# Patient Record
Sex: MALE | HISPANIC OR LATINO
[De-identification: names, ages, dates, MRNs, and addresses within clinical notes are randomized per-mention and may not be internally consistent; named-entity substitution may affect disease eponyms.]

---

## 2017-01-01 ENCOUNTER — HOSPITAL (OUTPATIENT)
Dept: OTHER | Age: 72
End: 2017-01-01
Attending: INTERNAL MEDICINE

## 2017-01-10 ENCOUNTER — PRIOR ORIGINAL RECORDS (OUTPATIENT)
Dept: OTHER | Age: 72
End: 2017-01-10

## 2017-01-12 ENCOUNTER — OFFICE VISIT (OUTPATIENT)
Dept: FAMILY MEDICINE CLINIC | Facility: CLINIC | Age: 72
End: 2017-01-12

## 2017-01-12 VITALS
WEIGHT: 168 LBS | DIASTOLIC BLOOD PRESSURE: 65 MMHG | SYSTOLIC BLOOD PRESSURE: 115 MMHG | BODY MASS INDEX: 26 KG/M2 | HEART RATE: 72 BPM

## 2017-01-12 DIAGNOSIS — E78.00 HIGH CHOLESTEROL: ICD-10-CM

## 2017-01-12 DIAGNOSIS — R73.03 PRE-DIABETES: ICD-10-CM

## 2017-01-12 DIAGNOSIS — I48.0 PAROXYSMAL A-FIB (HCC): ICD-10-CM

## 2017-01-12 DIAGNOSIS — N19 RENAL FAILURE: ICD-10-CM

## 2017-01-12 DIAGNOSIS — I34.1 MITRAL VALVE ANTERIOR LEAFLET PROLAPSE: Primary | ICD-10-CM

## 2017-01-12 DIAGNOSIS — I10 ESSENTIAL HYPERTENSION WITH GOAL BLOOD PRESSURE LESS THAN 130/80: ICD-10-CM

## 2017-01-12 PROCEDURE — G0463 HOSPITAL OUTPT CLINIC VISIT: HCPCS | Performed by: FAMILY MEDICINE

## 2017-01-12 PROCEDURE — 99214 OFFICE O/P EST MOD 30 MIN: CPT | Performed by: FAMILY MEDICINE

## 2017-01-12 NOTE — PROGRESS NOTES
\"I feel great. \"    I am so happy with my mitraclip. I don't hear the palpitations. \"    inr 2.1 last check    Sugar was only concern had 120 in the past.    \"I don't get dizzy I don't have a lot of thirst and I don't pee a lot.   I pee only once in the Renal failure  Stable. 4. High cholesterol  Excellent control    5. Essential hypertension with goal blood pressure less than 130/80  Stable. 6. Pre-diabetes  Recheck.

## 2017-01-21 LAB — INR BLDC: 1.5

## 2017-01-28 LAB — INR BLDC: 3.4

## 2017-02-01 ENCOUNTER — HOSPITAL (OUTPATIENT)
Dept: OTHER | Age: 72
End: 2017-02-01
Attending: INTERNAL MEDICINE

## 2017-02-04 LAB — INR BLDC: 4.1

## 2017-02-11 LAB — INR BLDC: 1.6

## 2017-02-18 LAB — INR BLDC: 3.9

## 2017-02-25 LAB — INR BLDC: 1.6

## 2017-03-06 ENCOUNTER — HOSPITAL (OUTPATIENT)
Dept: OTHER | Age: 72
End: 2017-03-06
Attending: INTERNAL MEDICINE

## 2017-03-06 LAB — INR BLDC: 1.6

## 2017-03-13 LAB — INR BLDC: 2.9

## 2017-04-01 ENCOUNTER — HOSPITAL (OUTPATIENT)
Dept: OTHER | Age: 72
End: 2017-04-01
Attending: INTERNAL MEDICINE

## 2017-04-15 LAB — INR BLDC: 2

## 2017-05-01 ENCOUNTER — HOSPITAL (OUTPATIENT)
Dept: OTHER | Age: 72
End: 2017-05-01
Attending: INTERNAL MEDICINE

## 2017-05-03 ENCOUNTER — HOSPITAL ENCOUNTER (OUTPATIENT)
Dept: CV DIAGNOSTICS | Facility: HOSPITAL | Age: 72
Discharge: HOME | End: 2017-05-03
Attending: THORACIC SURGERY (CARDIOTHORACIC VASCULAR SURGERY)
Payer: MEDICARE

## 2017-05-03 ENCOUNTER — HOSPITAL ENCOUNTER (OUTPATIENT)
Dept: CARDIOLOGY CLINIC | Facility: HOSPITAL | Age: 72
Discharge: HOME | End: 2017-05-03
Attending: NURSE PRACTITIONER
Payer: MEDICARE

## 2017-05-03 VITALS
TEMPERATURE: 99 F | OXYGEN SATURATION: 96 % | DIASTOLIC BLOOD PRESSURE: 81 MMHG | SYSTOLIC BLOOD PRESSURE: 139 MMHG | RESPIRATION RATE: 20 BRPM | HEART RATE: 82 BPM

## 2017-05-03 DIAGNOSIS — I35.0 AORTIC STENOSIS: ICD-10-CM

## 2017-05-03 PROCEDURE — 94620 HC PULMONARY STRESS TEST SIMPLE (6 MIN WALK): CPT

## 2017-05-03 PROCEDURE — 99214 OFFICE O/P EST MOD 30 MIN: CPT

## 2017-05-03 PROCEDURE — 93306 TTE W/DOPPLER COMPLETE: CPT

## 2017-05-03 PROCEDURE — 93306 TTE W/DOPPLER COMPLETE: CPT | Performed by: THORACIC SURGERY (CARDIOTHORACIC VASCULAR SURGERY)

## 2017-05-03 NOTE — PROGRESS NOTES
Frandy Roman Note    Subjective:   Patient presents for routine 1 year postop TMVR visit, accompanied by his wife. Patient underwent a transcather mitral valve repair with MitraClip x2 on 4/12/16 with improvement in MR from >4+ to 2+.  He un moderate regurgitation. 10. Pulmonary arteries: Systolic pressure was in the range of 60mm Hg to      65mm Hg.   Impressions:  This study is compared with previous dated 07/13/2016:  Increase in PA systolic pressure from approximately 45 mmHg to 60-65 mmHg today, the results of which will be sent to his cardiologist's office. He no longer needs to follow up at [facility] as he has completed the required 1 year post Mitraclip follow up. I advised him to follow up with  Dr Lacy Quiroz, and Dr Lennox Grow as directed.

## 2017-05-04 ENCOUNTER — TELEPHONE (OUTPATIENT)
Dept: CARDIOLOGY CLINIC | Facility: HOSPITAL | Age: 72
End: 2017-05-04

## 2017-05-20 LAB — INR BLDC: 2.5

## 2017-06-05 ENCOUNTER — PRIOR ORIGINAL RECORDS (OUTPATIENT)
Dept: OTHER | Age: 72
End: 2017-06-05

## 2017-06-08 ENCOUNTER — OFFICE VISIT (OUTPATIENT)
Dept: NEPHROLOGY | Facility: CLINIC | Age: 72
End: 2017-06-08

## 2017-06-08 VITALS
WEIGHT: 173.81 LBS | BODY MASS INDEX: 29.31 KG/M2 | SYSTOLIC BLOOD PRESSURE: 124 MMHG | DIASTOLIC BLOOD PRESSURE: 75 MMHG | HEIGHT: 64.5 IN | TEMPERATURE: 98 F | HEART RATE: 60 BPM

## 2017-06-08 DIAGNOSIS — R60.0 LOWER LEG EDEMA: ICD-10-CM

## 2017-06-08 DIAGNOSIS — N18.30 CKD (CHRONIC KIDNEY DISEASE), STAGE III (HCC): Primary | ICD-10-CM

## 2017-06-08 PROCEDURE — 99214 OFFICE O/P EST MOD 30 MIN: CPT | Performed by: INTERNAL MEDICINE

## 2017-06-08 PROCEDURE — G0463 HOSPITAL OUTPT CLINIC VISIT: HCPCS | Performed by: INTERNAL MEDICINE

## 2017-06-08 RX ORDER — TORSEMIDE 20 MG/1
20 TABLET ORAL DAILY
Qty: 90 TABLET | Refills: 1 | Status: SHIPPED | OUTPATIENT
Start: 2017-06-08 | End: 2017-06-21

## 2017-06-08 NOTE — PATIENT INSTRUCTIONS
Take torsemide 20mg twice a day for three day and than once a day   Daily weight and call on Monday with results  Lab work on Monday - Renal function test  Call to make appointment with Dr. Pao Pires for next week   Follow up in 2-3 weeks

## 2017-06-08 NOTE — PROGRESS NOTES
Progress Note     Brian Xie is a 66 yrs old male with pmh of CAD s/p PTCA and CABG x 4, A-fib on coumadin, CHF with EF 15-20%%, Mitral regurgitation s/p valve repair with clip, s/p ICD,  Ex tobacoo abuse x 20 yrs who presented today for follow up Scanned to Media Tab - Date of Service 01-    CABG      CARDIAC PACEMAKER PLACEMENT      Comment [facility]    CARDIAC DEFIBRILLATOR PLACEMENT      Comment [facility]. MITRACLIP  04/12/2016    Comment mitral valve           Medications (Active prior normal extraocular motion is intact  Nose/Mouth/Throat:mucous membranes are moist   Neck/Thyroid: neck is supple without adenopathy  Lymphatic: no abnormal cervical, supraclavicular adenopathy is noted  Respiratory:  lungs are clear to auscultation bilater

## 2017-06-09 ENCOUNTER — TELEPHONE (OUTPATIENT)
Dept: NEPHROLOGY | Facility: CLINIC | Age: 72
End: 2017-06-09

## 2017-06-09 NOTE — TELEPHONE ENCOUNTER
Current Outpatient Prescriptions:  Isosorb Dinitrate-Hydralazine 20-37.5 MG Oral Tab Take 2 tablets by mouth 3 (three) times daily.  Disp: 180 tablet Rfl: 0     PA request pls call [phone] Pt ID# [id number]

## 2017-06-09 NOTE — TELEPHONE ENCOUNTER
Combination pill was denied coverage. Insurance wants patient to take both medications separatly (they are covered) and will only approve if patient has failed treatment on individual medications.

## 2017-06-09 NOTE — TELEPHONE ENCOUNTER
[employer] contacted at the phone# provided. PA started. Office will be notified of decision within 24 hours.

## 2017-06-10 NOTE — TELEPHONE ENCOUNTER
Pls find out from patient who initially prescribed this medication - I think Medication was initially prescribed by cardiology.  Send the refill request to Dr. Zenaida Hanson office

## 2017-06-12 ENCOUNTER — TELEPHONE (OUTPATIENT)
Dept: NEPHROLOGY | Facility: CLINIC | Age: 72
End: 2017-06-12

## 2017-06-12 ENCOUNTER — APPOINTMENT (OUTPATIENT)
Dept: LAB | Facility: HOSPITAL | Age: 72
End: 2017-06-12
Attending: INTERNAL MEDICINE
Payer: MEDICARE

## 2017-06-12 DIAGNOSIS — N17.9 AKI (ACUTE KIDNEY INJURY) (HCC): Primary | ICD-10-CM

## 2017-06-12 DIAGNOSIS — R60.0 LOWER LEG EDEMA: ICD-10-CM

## 2017-06-12 DIAGNOSIS — N18.30 CKD (CHRONIC KIDNEY DISEASE), STAGE III (HCC): ICD-10-CM

## 2017-06-12 PROCEDURE — 36415 COLL VENOUS BLD VENIPUNCTURE: CPT

## 2017-06-12 PROCEDURE — 80069 RENAL FUNCTION PANEL: CPT

## 2017-06-12 NOTE — TELEPHONE ENCOUNTER
Left voice message. Please call later today and inform patient that Weight loss is good. almost 8lbs . Reduce toresmide to 20 mg once a day alternating with 10 mg daily dose. Call DR. Deonna Austin office for bidil prescription and to make appointment

## 2017-06-12 NOTE — TELEPHONE ENCOUNTER
Patient walked in to the office to give Dr. Marlyn Holloway a paper with his weights from 6/9/17 through 6/12/17. Placed on Dr. Niurka Ricardo desk.

## 2017-06-14 NOTE — TELEPHONE ENCOUNTER
Rn received fax for Dr Mady Claros re rx - Isosorbdinitrate/hydralazine 20-37.5 mg - did not start pt on this med

## 2017-06-14 NOTE — TELEPHONE ENCOUNTER
Patient contacted. Dr. Roberta Silvestre message relayed. Patient is aware to hold Torsemide for 2 days then alternate one day 10mg the next day 20mg and do lab work on Friday. Also advised to call Dr. Yaneli Rico next week with weights.  Dr. Clinton Lizama office said the

## 2017-06-16 ENCOUNTER — APPOINTMENT (OUTPATIENT)
Dept: LAB | Facility: HOSPITAL | Age: 72
End: 2017-06-16
Attending: INTERNAL MEDICINE
Payer: MEDICARE

## 2017-06-16 DIAGNOSIS — N17.9 AKI (ACUTE KIDNEY INJURY) (HCC): ICD-10-CM

## 2017-06-16 PROCEDURE — 80048 BASIC METABOLIC PNL TOTAL CA: CPT

## 2017-06-16 PROCEDURE — 36415 COLL VENOUS BLD VENIPUNCTURE: CPT

## 2017-06-19 ENCOUNTER — TELEPHONE (OUTPATIENT)
Dept: NEPHROLOGY | Facility: CLINIC | Age: 72
End: 2017-06-19

## 2017-06-19 DIAGNOSIS — N17.9 AKI (ACUTE KIDNEY INJURY) (HCC): Primary | ICD-10-CM

## 2017-06-19 NOTE — TELEPHONE ENCOUNTER
pls instruct patient to hold torsemide for 2 days and than reduce torsemide to 10 mg daily dose.      Find out about daily weight     Repeat BMP in one week - ordered

## 2017-06-21 RX ORDER — TORSEMIDE 20 MG/1
10 TABLET ORAL DAILY
Qty: 90 TABLET | Refills: 1 | COMMUNITY
Start: 2017-06-21 | End: 2018-01-08

## 2017-06-21 NOTE — TELEPHONE ENCOUNTER
Contacted pt. Notified him of [facility]'s message below. He is to hold torsemide for 2 days and then start taking 10 mg (1/2 tab) once daily. Patient gave verbal read-back of instructions. He says his weight is down from 170 lb to 162 lbs now.  He will repeat BMP

## 2017-06-24 ENCOUNTER — PRIOR ORIGINAL RECORDS (OUTPATIENT)
Dept: OTHER | Age: 72
End: 2017-06-24

## 2017-06-24 ENCOUNTER — APPOINTMENT (OUTPATIENT)
Dept: LAB | Facility: HOSPITAL | Age: 72
End: 2017-06-24
Attending: INTERNAL MEDICINE
Payer: MEDICARE

## 2017-06-24 DIAGNOSIS — N17.9 AKI (ACUTE KIDNEY INJURY) (HCC): ICD-10-CM

## 2017-06-24 PROCEDURE — 36415 COLL VENOUS BLD VENIPUNCTURE: CPT

## 2017-06-24 PROCEDURE — 80048 BASIC METABOLIC PNL TOTAL CA: CPT

## 2017-06-29 ENCOUNTER — OFFICE VISIT (OUTPATIENT)
Dept: NEPHROLOGY | Facility: CLINIC | Age: 72
End: 2017-06-29

## 2017-06-29 VITALS
WEIGHT: 166.38 LBS | DIASTOLIC BLOOD PRESSURE: 68 MMHG | HEART RATE: 58 BPM | BODY MASS INDEX: 28.41 KG/M2 | TEMPERATURE: 98 F | HEIGHT: 64 IN | SYSTOLIC BLOOD PRESSURE: 119 MMHG

## 2017-06-29 DIAGNOSIS — N17.9 AKI (ACUTE KIDNEY INJURY) (HCC): Primary | ICD-10-CM

## 2017-06-29 PROCEDURE — 99214 OFFICE O/P EST MOD 30 MIN: CPT | Performed by: INTERNAL MEDICINE

## 2017-06-29 PROCEDURE — G0463 HOSPITAL OUTPT CLINIC VISIT: HCPCS | Performed by: INTERNAL MEDICINE

## 2017-06-29 RX ORDER — METOLAZONE 2.5 MG/1
2.5 TABLET ORAL EVERY OTHER DAY
Qty: 30 TABLET | Refills: 0 | Status: SHIPPED | OUTPATIENT
Start: 2017-06-29 | End: 2017-07-27 | Stop reason: ALTCHOICE

## 2017-06-29 NOTE — PATIENT INSTRUCTIONS
Take metolazone 2.5 mg every other day for 3 doses   Lab work in 4 weeks   Follow up in 8 weeks   Daily weight

## 2017-06-29 NOTE — PROGRESS NOTES
Progress Note     Goldy Cruz is a 66 yrs old male with pmh of CAD s/p PTCA and CABG x 4, A-fib on coumadin, CHF with EF 15-20%%, Mitral regurgitation s/p valve repair with clip, s/p ICD,  Ex tobacoo abuse x 20 yrs who presented today for follow up Service                01- 04/12/2016: NEETU MITRACLIP      Comment: mitral valve  2004: OTHER SURGICAL HISTORY      Comment: Quadruple bypass  No date: OTHER SURGICAL HISTORY      Comment: Nasal surgery        Medications (Active prior to today's vis extraocular motion is intact  Nose/Mouth/Throat:mucous membranes are moist   Neck/Thyroid: neck is supple without adenopathy  Lymphatic: no abnormal cervical, supraclavicular adenopathy is noted  Respiratory:  lungs are clear to auscultation bilaterally  C

## 2017-07-10 ENCOUNTER — OFFICE VISIT (OUTPATIENT)
Dept: FAMILY MEDICINE CLINIC | Facility: CLINIC | Age: 72
End: 2017-07-10

## 2017-07-10 ENCOUNTER — APPOINTMENT (OUTPATIENT)
Dept: LAB | Age: 72
End: 2017-07-10
Attending: PHYSICIAN ASSISTANT
Payer: MEDICARE

## 2017-07-10 VITALS
BODY MASS INDEX: 28 KG/M2 | SYSTOLIC BLOOD PRESSURE: 106 MMHG | DIASTOLIC BLOOD PRESSURE: 65 MMHG | HEART RATE: 64 BPM | WEIGHT: 166 LBS

## 2017-07-10 DIAGNOSIS — M25.561 ACUTE PAIN OF RIGHT KNEE: ICD-10-CM

## 2017-07-10 DIAGNOSIS — R60.0 EDEMA OF BOTH FEET: ICD-10-CM

## 2017-07-10 DIAGNOSIS — I48.0 PAROXYSMAL ATRIAL FIBRILLATION (HCC): ICD-10-CM

## 2017-07-10 DIAGNOSIS — I48.0 PAROXYSMAL ATRIAL FIBRILLATION (HCC): Primary | ICD-10-CM

## 2017-07-10 LAB
INR BLD: 2 (ref 0.9–1.2)
PROTHROMBIN TIME: 22.1 SECONDS (ref 11.8–14.5)
URATE SERPL-MCNC: 10.2 MG/DL (ref 3.3–8.7)

## 2017-07-10 PROCEDURE — 99213 OFFICE O/P EST LOW 20 MIN: CPT | Performed by: PHYSICIAN ASSISTANT

## 2017-07-10 PROCEDURE — 36415 COLL VENOUS BLD VENIPUNCTURE: CPT

## 2017-07-10 PROCEDURE — 84550 ASSAY OF BLOOD/URIC ACID: CPT

## 2017-07-10 PROCEDURE — 85610 PROTHROMBIN TIME: CPT

## 2017-07-10 PROCEDURE — G0463 HOSPITAL OUTPT CLINIC VISIT: HCPCS | Performed by: PHYSICIAN ASSISTANT

## 2017-07-10 NOTE — PROGRESS NOTES
HPI:    Patient ID: Zack Juan is a 74year old male. Patient presents for swelling of both feet for past few days. Patient sees nephrologist and was prescribed metolazone in addition to his torsemide.   Patient was having swelling of bilateral low Rfl:    carvedilol (COREG) 25 MG Oral Tab Take 25 mg by mouth 2 (two) times daily. Disp:  Rfl:    Sotalol HCl (BETAPACE) 80 MG Oral Tab Take 1 tablet by mouth 2 (two) times daily.  Disp:  Rfl: 5   Warfarin Sodium (COUMADIN) 5 MG Oral Tab Take 1 tablet by

## 2017-07-11 ENCOUNTER — TELEPHONE (OUTPATIENT)
Dept: FAMILY MEDICINE CLINIC | Facility: CLINIC | Age: 72
End: 2017-07-11

## 2017-07-11 NOTE — TELEPHONE ENCOUNTER
Spoke to pt today to notify him of his lab results and to keep his scheduled appointment with his provider and the coumadin management clinic. Informed pt should symptoms worsen to don't hesitate to schedule a f/u. Pt verbalized understanding.

## 2017-07-17 ENCOUNTER — TELEPHONE (OUTPATIENT)
Dept: FAMILY MEDICINE CLINIC | Facility: CLINIC | Age: 72
End: 2017-07-17

## 2017-07-17 ENCOUNTER — OFFICE VISIT (OUTPATIENT)
Dept: FAMILY MEDICINE CLINIC | Facility: CLINIC | Age: 72
End: 2017-07-17

## 2017-07-17 VITALS
HEART RATE: 59 BPM | SYSTOLIC BLOOD PRESSURE: 116 MMHG | WEIGHT: 158 LBS | DIASTOLIC BLOOD PRESSURE: 67 MMHG | BODY MASS INDEX: 27 KG/M2

## 2017-07-17 DIAGNOSIS — R73.01 IMPAIRED FASTING GLUCOSE: ICD-10-CM

## 2017-07-17 DIAGNOSIS — E78.00 HIGH CHOLESTEROL: ICD-10-CM

## 2017-07-17 DIAGNOSIS — I10 ESSENTIAL HYPERTENSION WITH GOAL BLOOD PRESSURE LESS THAN 130/80: ICD-10-CM

## 2017-07-17 DIAGNOSIS — N17.9 ACUTE RENAL FAILURE, UNSPECIFIED ACUTE RENAL FAILURE TYPE (HCC): ICD-10-CM

## 2017-07-17 DIAGNOSIS — I48.0 PAROXYSMAL ATRIAL FIBRILLATION (HCC): Primary | ICD-10-CM

## 2017-07-17 DIAGNOSIS — I34.1 MITRAL VALVE ANTERIOR LEAFLET PROLAPSE: ICD-10-CM

## 2017-07-17 PROCEDURE — 99214 OFFICE O/P EST MOD 30 MIN: CPT | Performed by: FAMILY MEDICINE

## 2017-07-17 PROCEDURE — G0463 HOSPITAL OUTPT CLINIC VISIT: HCPCS | Performed by: FAMILY MEDICINE

## 2017-07-18 NOTE — PROGRESS NOTES
Patient had episodes of gout recently. He was unsure of all of his medications he was taking. He had elevated blood sugars in the 240 range. Suspect some steroid use for his gout. Prior to that his sugars have been in the 120s.   He had acute renal fail failure, unspecified acute renal failure type (HCC)  improvin  - COMP METABOLIC PANEL (14); Future    3. High cholesterol  Controlled. Will recheck    4. Mitral valve anterior leaflet prolapse  resovled with clip    5.  Essential hypertension with goal bl

## 2017-07-19 NOTE — TELEPHONE ENCOUNTER
PA for Diclofenac sodium 1% gel completed with [employer] via CMM response time 3-5 business days 347 No Ninoska Cruz.

## 2017-07-20 LAB
BUN: 40 MG/DL
CALCIUM: 8.7 MG/DL
CHLORIDE: 107 MEQ/L
CREATININE, SERUM: 1.81 MG/DL
GLUCOSE: 242 MG/DL
POTASSIUM, SERUM: 4.8 MEQ/L
SODIUM: 142 MEQ/L

## 2017-07-21 ENCOUNTER — PRIOR ORIGINAL RECORDS (OUTPATIENT)
Dept: OTHER | Age: 72
End: 2017-07-21

## 2017-07-21 NOTE — TELEPHONE ENCOUNTER
Plan states the generic is covered; diclofenac sodium 1% gel. Keith Altman and patient picked up the medication.

## 2017-07-27 ENCOUNTER — OFFICE VISIT (OUTPATIENT)
Dept: NEPHROLOGY | Facility: CLINIC | Age: 72
End: 2017-07-27

## 2017-07-27 VITALS
WEIGHT: 156 LBS | TEMPERATURE: 98 F | DIASTOLIC BLOOD PRESSURE: 66 MMHG | HEIGHT: 64 IN | SYSTOLIC BLOOD PRESSURE: 112 MMHG | HEART RATE: 57 BPM | BODY MASS INDEX: 26.63 KG/M2

## 2017-07-27 DIAGNOSIS — N17.9 AKI (ACUTE KIDNEY INJURY) (HCC): Primary | ICD-10-CM

## 2017-07-27 DIAGNOSIS — N18.30 CKD (CHRONIC KIDNEY DISEASE), STAGE III (HCC): ICD-10-CM

## 2017-07-27 PROCEDURE — 99214 OFFICE O/P EST MOD 30 MIN: CPT | Performed by: INTERNAL MEDICINE

## 2017-07-27 PROCEDURE — G0463 HOSPITAL OUTPT CLINIC VISIT: HCPCS | Performed by: INTERNAL MEDICINE

## 2017-07-27 NOTE — PROGRESS NOTES
Progress Note     Brian Xie is a 66 yrs old male with pmh of CAD s/p PTCA and CABG x 4, A-fib on coumadin, CHF with EF 15-20%%, Mitral regurgitation s/p valve repair with clip, s/p ICD,  Ex tobacoo abuse x 20 yrs, Gout who presented today for follow Service                01- 04/12/2016: NEETU MITRACLIP      Comment: mitral valve  2004: OTHER SURGICAL HISTORY      Comment: Quadruple bypass  No date: OTHER SURGICAL HISTORY      Comment: Nasal surgery        Medications (Active prior to today's vis motion is intact  Nose/Mouth/Throat:mucous membranes are moist   Neck/Thyroid: neck is supple without adenopathy  Lymphatic: no abnormal cervical, supraclavicular adenopathy is noted  Respiratory:  lungs are clear to auscultation bilaterally  Cardiovascula

## 2017-08-12 ENCOUNTER — LAB ENCOUNTER (OUTPATIENT)
Dept: LAB | Age: 72
End: 2017-08-12
Attending: FAMILY MEDICINE
Payer: MEDICARE

## 2017-08-12 DIAGNOSIS — N18.30 CKD (CHRONIC KIDNEY DISEASE), STAGE III (HCC): ICD-10-CM

## 2017-08-12 DIAGNOSIS — N17.9 AKI (ACUTE KIDNEY INJURY) (HCC): ICD-10-CM

## 2017-08-12 DIAGNOSIS — N17.9 ACUTE RENAL FAILURE, UNSPECIFIED ACUTE RENAL FAILURE TYPE (HCC): ICD-10-CM

## 2017-08-12 DIAGNOSIS — R73.01 IMPAIRED FASTING GLUCOSE: ICD-10-CM

## 2017-08-12 LAB
ALBUMIN SERPL BCP-MCNC: 3.7 G/DL (ref 3.5–4.8)
ALBUMIN/GLOB SERPL: 1.2 {RATIO} (ref 1–2)
ALP SERPL-CCNC: 68 U/L (ref 32–100)
ALT SERPL-CCNC: 20 U/L (ref 17–63)
ANION GAP SERPL CALC-SCNC: 10 MMOL/L (ref 0–18)
AST SERPL-CCNC: 26 U/L (ref 15–41)
BASOPHILS # BLD: 0 K/UL (ref 0–0.2)
BASOPHILS NFR BLD: 1 %
BILIRUB SERPL-MCNC: 1.3 MG/DL (ref 0.3–1.2)
BUN SERPL-MCNC: 28 MG/DL (ref 8–20)
BUN/CREAT SERPL: 17.2 (ref 10–20)
CALCIUM SERPL-MCNC: 9.1 MG/DL (ref 8.5–10.5)
CHLORIDE SERPL-SCNC: 95 MMOL/L (ref 95–110)
CO2 SERPL-SCNC: 34 MMOL/L (ref 22–32)
CREAT SERPL-MCNC: 1.63 MG/DL (ref 0.5–1.5)
EOSINOPHIL # BLD: 0.2 K/UL (ref 0–0.7)
EOSINOPHIL NFR BLD: 4 %
ERYTHROCYTE [DISTWIDTH] IN BLOOD BY AUTOMATED COUNT: 15.4 % (ref 11–15)
GLOBULIN PLAS-MCNC: 3 G/DL (ref 2.5–3.7)
GLUCOSE SERPL-MCNC: 133 MG/DL (ref 70–99)
HBA1C MFR BLD: 8.2 % (ref 4–6)
HCT VFR BLD AUTO: 50.2 % (ref 41–52)
HGB BLD-MCNC: 16.3 G/DL (ref 13.5–17.5)
LYMPHOCYTES # BLD: 1.2 K/UL (ref 1–4)
LYMPHOCYTES NFR BLD: 24 %
MCH RBC QN AUTO: 30.6 PG (ref 27–32)
MCHC RBC AUTO-ENTMCNC: 32.5 G/DL (ref 32–37)
MCV RBC AUTO: 94.1 FL (ref 80–100)
MONOCYTES # BLD: 0.5 K/UL (ref 0–1)
MONOCYTES NFR BLD: 11 %
NEUTROPHILS # BLD AUTO: 3 K/UL (ref 1.8–7.7)
NEUTROPHILS NFR BLD: 61 %
OSMOLALITY UR CALC.SUM OF ELEC: 295 MOSM/KG (ref 275–295)
PLATELET # BLD AUTO: 96 K/UL (ref 140–400)
PMV BLD AUTO: 11 FL (ref 7.4–10.3)
POTASSIUM SERPL-SCNC: 3.9 MMOL/L (ref 3.3–5.1)
PROT SERPL-MCNC: 6.7 G/DL (ref 5.9–8.4)
RBC # BLD AUTO: 5.33 M/UL (ref 4.5–5.9)
SODIUM SERPL-SCNC: 139 MMOL/L (ref 136–144)
WBC # BLD AUTO: 4.9 K/UL (ref 4–11)

## 2017-08-12 PROCEDURE — 36415 COLL VENOUS BLD VENIPUNCTURE: CPT

## 2017-08-12 PROCEDURE — 83036 HEMOGLOBIN GLYCOSYLATED A1C: CPT

## 2017-08-12 PROCEDURE — 85025 COMPLETE CBC W/AUTO DIFF WBC: CPT

## 2017-08-12 PROCEDURE — 80053 COMPREHEN METABOLIC PANEL: CPT

## 2017-08-17 ENCOUNTER — MED REC SCAN ONLY (OUTPATIENT)
Dept: FAMILY MEDICINE CLINIC | Facility: CLINIC | Age: 72
End: 2017-08-17

## 2017-08-18 ENCOUNTER — TELEPHONE (OUTPATIENT)
Dept: FAMILY MEDICINE CLINIC | Facility: CLINIC | Age: 72
End: 2017-08-18

## 2017-08-18 NOTE — TELEPHONE ENCOUNTER
----- Message from Katia Contreras DO sent at 8/17/2017  8:07 AM CDT -----  Blood sugar is up markedly from a year ago. I have to address his blood sugar because if his sugars continue to be elevated he can have more trouble with his kidney.

## 2017-08-19 ENCOUNTER — HOSPITAL (OUTPATIENT)
Dept: OTHER | Age: 72
End: 2017-08-19
Attending: INTERNAL MEDICINE

## 2017-08-19 LAB — INR BLDC: 2.1

## 2017-09-01 ENCOUNTER — HOSPITAL (OUTPATIENT)
Dept: OTHER | Age: 72
End: 2017-09-01
Attending: INTERNAL MEDICINE

## 2017-09-11 ENCOUNTER — OFFICE VISIT (OUTPATIENT)
Dept: FAMILY MEDICINE CLINIC | Facility: CLINIC | Age: 72
End: 2017-09-11

## 2017-09-11 VITALS
HEART RATE: 58 BPM | DIASTOLIC BLOOD PRESSURE: 66 MMHG | TEMPERATURE: 98 F | RESPIRATION RATE: 16 BRPM | HEIGHT: 64 IN | WEIGHT: 164 LBS | BODY MASS INDEX: 28 KG/M2 | SYSTOLIC BLOOD PRESSURE: 110 MMHG

## 2017-09-11 DIAGNOSIS — N17.9 ACUTE RENAL FAILURE, UNSPECIFIED ACUTE RENAL FAILURE TYPE (HCC): Primary | ICD-10-CM

## 2017-09-11 DIAGNOSIS — Z23 NEED FOR VACCINATION: ICD-10-CM

## 2017-09-11 DIAGNOSIS — E11.9 NEW ONSET TYPE 2 DIABETES MELLITUS (HCC): ICD-10-CM

## 2017-09-11 PROCEDURE — G0008 ADMIN INFLUENZA VIRUS VAC: HCPCS | Performed by: FAMILY MEDICINE

## 2017-09-11 PROCEDURE — 90653 IIV ADJUVANT VACCINE IM: CPT | Performed by: FAMILY MEDICINE

## 2017-09-11 PROCEDURE — 99215 OFFICE O/P EST HI 40 MIN: CPT | Performed by: FAMILY MEDICINE

## 2017-09-11 PROCEDURE — G0463 HOSPITAL OUTPT CLINIC VISIT: HCPCS | Performed by: FAMILY MEDICINE

## 2017-09-11 RX ORDER — PEN NEEDLE, DIABETIC 31 G X1/4"
NEEDLE, DISPOSABLE MISCELLANEOUS
Qty: 100 EACH | Refills: 3 | Status: SHIPPED | OUTPATIENT
Start: 2017-09-11

## 2017-09-12 ENCOUNTER — TELEPHONE (OUTPATIENT)
Dept: FAMILY MEDICINE CLINIC | Facility: CLINIC | Age: 72
End: 2017-09-12

## 2017-09-12 NOTE — TELEPHONE ENCOUNTER
Pharmacy calling needs clarification on the directions.   (said it is usually 0.6mg 1 st week, etc )    Current Outpatient Prescriptions:  Liraglutide (VICTOZA) 18 MG/3ML Subcutaneous Solution Pen-injector 6mg sub q daily for 1 week 1.2 mg sub q daily for

## 2017-09-14 NOTE — TELEPHONE ENCOUNTER
Prescription resent with change as written by Dr. [facility]

## 2017-09-15 ENCOUNTER — OFFICE VISIT (OUTPATIENT)
Dept: FAMILY MEDICINE CLINIC | Facility: CLINIC | Age: 72
End: 2017-09-15

## 2017-09-15 VITALS
DIASTOLIC BLOOD PRESSURE: 64 MMHG | WEIGHT: 164 LBS | HEART RATE: 71 BPM | SYSTOLIC BLOOD PRESSURE: 106 MMHG | BODY MASS INDEX: 28 KG/M2

## 2017-09-15 DIAGNOSIS — E11.9 NEW ONSET TYPE 2 DIABETES MELLITUS (HCC): Primary | ICD-10-CM

## 2017-09-15 PROCEDURE — G0463 HOSPITAL OUTPT CLINIC VISIT: HCPCS | Performed by: FAMILY MEDICINE

## 2017-09-15 PROCEDURE — 99214 OFFICE O/P EST MOD 30 MIN: CPT | Performed by: FAMILY MEDICINE

## 2017-09-15 NOTE — PROGRESS NOTES
Patient presents for follow-up on his new onset diabetes mellitus. Because of his current medical conditions it was felt that the best option would be to begin Victoza.     We discussed medication we discussed proper handling and usage we discussed the kristel

## 2017-09-25 LAB
INR BLDC: NORMAL
INR PPP: 4.9
PROTHROMBIN TIME: 55.5 SECONDS (ref 9.7–11.8)
PROTHROMBIN TIME: ABNORMAL

## 2017-09-27 LAB — INR BLDC: 3.9

## 2017-09-28 NOTE — PROGRESS NOTES
No exam.   discussion   patient presents for follow-up on his laboratory. It was found that he has acute renal failure as well as diabetes mellitus. We discussed the treatment options and will need to use injectables.   We discussed why he cannot use pi

## 2017-09-29 ENCOUNTER — OFFICE VISIT (OUTPATIENT)
Dept: FAMILY MEDICINE CLINIC | Facility: CLINIC | Age: 72
End: 2017-09-29

## 2017-09-29 VITALS
BODY MASS INDEX: 28 KG/M2 | SYSTOLIC BLOOD PRESSURE: 125 MMHG | DIASTOLIC BLOOD PRESSURE: 79 MMHG | WEIGHT: 161 LBS | HEART RATE: 78 BPM

## 2017-09-29 DIAGNOSIS — E11.9 NEW ONSET TYPE 2 DIABETES MELLITUS (HCC): Primary | ICD-10-CM

## 2017-09-29 DIAGNOSIS — I48.0 PAROXYSMAL ATRIAL FIBRILLATION (HCC): ICD-10-CM

## 2017-09-29 LAB
GLUCOSE BLOOD: 141
TEST STRIP LOT #: NORMAL NUMERIC

## 2017-09-29 PROCEDURE — 82962 GLUCOSE BLOOD TEST: CPT | Performed by: FAMILY MEDICINE

## 2017-09-29 PROCEDURE — G0463 HOSPITAL OUTPT CLINIC VISIT: HCPCS | Performed by: FAMILY MEDICINE

## 2017-09-29 PROCEDURE — 99214 OFFICE O/P EST MOD 30 MIN: CPT | Performed by: FAMILY MEDICINE

## 2017-09-29 PROCEDURE — 36416 COLLJ CAPILLARY BLOOD SPEC: CPT | Performed by: FAMILY MEDICINE

## 2017-09-29 NOTE — PROGRESS NOTES
\"I feel 38years old\"  Less swelling  More energy. Not checking blood sugar. fbs here was 141    Patient denies problems with their medication. Denies ulcers, chest pain, dyspnea on exertion.     gen nad  Ht rrr no M no S3 S4  Lung clear no wheeze  a

## 2017-10-01 ENCOUNTER — HOSPITAL (OUTPATIENT)
Dept: OTHER | Age: 72
End: 2017-10-01
Attending: INTERNAL MEDICINE

## 2017-10-02 LAB — INR BLDC: 1.8

## 2017-10-09 LAB — INR BLDC: 2.9

## 2017-10-24 LAB — INR BLDC: 3.7

## 2017-11-06 ENCOUNTER — HOSPITAL (OUTPATIENT)
Dept: OTHER | Age: 72
End: 2017-11-06
Attending: INTERNAL MEDICINE

## 2017-11-06 LAB — INR BLDC: 2.2

## 2017-11-27 ENCOUNTER — TELEPHONE (OUTPATIENT)
Dept: NEPHROLOGY | Facility: CLINIC | Age: 72
End: 2017-11-27

## 2017-11-27 DIAGNOSIS — E79.0 ELEVATED URIC ACID IN BLOOD: Primary | ICD-10-CM

## 2017-11-27 LAB — INR BLDC: 2.1

## 2017-11-27 NOTE — TELEPHONE ENCOUNTER
Pt is requesting to have order for Uric Acid test put in prior to appt with [facility] on 11/30/17.  Please call thank you [phone]

## 2017-11-30 ENCOUNTER — OFFICE VISIT (OUTPATIENT)
Dept: NEPHROLOGY | Facility: CLINIC | Age: 72
End: 2017-11-30

## 2017-11-30 VITALS
HEART RATE: 80 BPM | WEIGHT: 165.19 LBS | TEMPERATURE: 98 F | DIASTOLIC BLOOD PRESSURE: 77 MMHG | HEIGHT: 66 IN | SYSTOLIC BLOOD PRESSURE: 125 MMHG | BODY MASS INDEX: 26.55 KG/M2

## 2017-11-30 DIAGNOSIS — M10.9 ACUTE GOUT OF LEFT FOOT, UNSPECIFIED CAUSE: ICD-10-CM

## 2017-11-30 DIAGNOSIS — N18.30 CKD (CHRONIC KIDNEY DISEASE), STAGE III (HCC): Primary | ICD-10-CM

## 2017-11-30 DIAGNOSIS — R60.0 LOWER LEG EDEMA: ICD-10-CM

## 2017-11-30 PROCEDURE — 99215 OFFICE O/P EST HI 40 MIN: CPT | Performed by: INTERNAL MEDICINE

## 2017-11-30 PROCEDURE — G0463 HOSPITAL OUTPT CLINIC VISIT: HCPCS | Performed by: INTERNAL MEDICINE

## 2017-11-30 RX ORDER — PREDNISONE 10 MG/1
30 TABLET ORAL DAILY
Qty: 15 TABLET | Refills: 0 | Status: SHIPPED | OUTPATIENT
Start: 2017-11-30 | End: 2017-12-05

## 2017-11-30 RX ORDER — GLIMEPIRIDE 2 MG/1
2 TABLET ORAL
Qty: 30 TABLET | Refills: 0 | Status: SHIPPED | OUTPATIENT
Start: 2017-11-30 | End: 2018-01-08

## 2017-11-30 RX ORDER — LISINOPRIL 5 MG/1
10 TABLET ORAL DAILY
COMMUNITY
End: 2018-06-28 | Stop reason: ALTCHOICE

## 2017-11-30 NOTE — PROGRESS NOTES
Progress Note     Char Trejo is a 66 yrs old male with pmh of CAD s/p PTCA and CABG x 4, A-fib on coumadin, CHF with EF 15-20%%, Mitral regurgitation s/p valve repair with clip, s/p ICD,  Ex tobacoo abuse x 20 yrs, Gout who presented today for follow Comment: mitral valve  2004: OTHER SURGICAL HISTORY      Comment: Quadruple bypass  No date: OTHER SURGICAL HISTORY      Comment: Nasal surgery        Medications (Active prior to today's visit):    Current Outpatient Prescriptions:  lisinopril 5 MG Oral symptoms  Neurological:  Negative for gait disturbance  Psychiatric:  Negative for inappropriate interaction        11/30/17  1708   BP: 125/77   Pulse: 80   Temp: 97.6 °F (36.4 °C)       PHYSICAL EXAM:     Constitutional: appears well hydrated alert and r allopurinol given frequent attacks     Follow up in one week . Orders This Visit:  No orders of the defined types were placed in this encounter.       Meds This Visit:    Signed Prescriptions Disp Refills    predniSONE 10 MG Oral Tab 15 tablet 0      Si

## 2017-11-30 NOTE — PATIENT INSTRUCTIONS
Take torsemide 20 mg for 3 days  Prednisone 30 mg daily dose for 5 days   Blood test tomm am   Follow up next week on Tuesday   Take glimepride - new medication for diabetes for 7 days only

## 2017-12-01 ENCOUNTER — APPOINTMENT (OUTPATIENT)
Dept: LAB | Facility: HOSPITAL | Age: 72
End: 2017-12-01
Attending: INTERNAL MEDICINE
Payer: MEDICARE

## 2017-12-01 ENCOUNTER — HOSPITAL (OUTPATIENT)
Dept: OTHER | Age: 72
End: 2017-12-01
Attending: INTERNAL MEDICINE

## 2017-12-01 DIAGNOSIS — E79.0 ELEVATED URIC ACID IN BLOOD: ICD-10-CM

## 2017-12-01 PROCEDURE — 36415 COLL VENOUS BLD VENIPUNCTURE: CPT

## 2017-12-01 PROCEDURE — 84550 ASSAY OF BLOOD/URIC ACID: CPT

## 2017-12-05 ENCOUNTER — OFFICE VISIT (OUTPATIENT)
Dept: NEPHROLOGY | Facility: CLINIC | Age: 72
End: 2017-12-05

## 2017-12-05 VITALS
SYSTOLIC BLOOD PRESSURE: 124 MMHG | WEIGHT: 166.19 LBS | DIASTOLIC BLOOD PRESSURE: 78 MMHG | HEIGHT: 66 IN | HEART RATE: 56 BPM | BODY MASS INDEX: 26.71 KG/M2

## 2017-12-05 DIAGNOSIS — N18.30 CKD (CHRONIC KIDNEY DISEASE), STAGE III (HCC): Primary | ICD-10-CM

## 2017-12-05 PROCEDURE — G0463 HOSPITAL OUTPT CLINIC VISIT: HCPCS | Performed by: INTERNAL MEDICINE

## 2017-12-05 PROCEDURE — 99214 OFFICE O/P EST MOD 30 MIN: CPT | Performed by: INTERNAL MEDICINE

## 2017-12-05 NOTE — PROGRESS NOTES
Progress Note     Levander Meckel is a 66 yrs old male with pmh of CAD s/p PTCA and CABG x 4, A-fib on coumadin, CHF with EF 15-20%%, Mitral regurgitation s/p valve repair with clip, s/p ICD,  Ex tobacoo abuse x 20 yrs, Gout who presented today for follow 01- 04/12/2016: NEETU MITRACLIP      Comment: mitral valve  2004: OTHER SURGICAL HISTORY      Comment: Quadruple bypass  No date: OTHER SURGICAL HISTORY      Comment: Nasal surgery        Medications (Active prior to today's visit):    Awilda Cadena disturbance  Psychiatric:  Negative for inappropriate interaction        12/05/17  1523   BP: 124/78   Pulse: 56       PHYSICAL EXAM:     Constitutional: appears well hydrated alert and responsive no acute distress noted  Head/Face: normocephalic  Eyes/Vis Visit:    Orders Placed This Encounter      Renal Function Panel    Meds This Visit:    No prescriptions requested or ordered in this encounter    Imaging & Referrals:  None     12/7/2017  Kecia Portillo MD

## 2017-12-23 LAB — INR BLDC: 2.8

## 2017-12-26 ENCOUNTER — TELEPHONE (OUTPATIENT)
Dept: FAMILY MEDICINE CLINIC | Facility: CLINIC | Age: 72
End: 2017-12-26

## 2017-12-26 NOTE — TELEPHONE ENCOUNTER
, Please see patient message below. Future orders in system that were order on 09/17 of this year. Ok to proceed with having those labs done. Please advise.

## 2017-12-26 NOTE — TELEPHONE ENCOUNTER
Trinidadian SPEAKER  Pt would like to request orders for blood work prior to apt 1/8 please advise, thank you. Pt would like a return call once the order is placed. Please advise, thank you.

## 2017-12-27 NOTE — TELEPHONE ENCOUNTER
Pt would like a refill on victoza medication. Per pt he is out of medication.  Pharmacy: [facility]/[place] (listed)       Current Outpatient Prescriptions:  Liraglutide (VICTOZA) 18 MG/3ML Subcutaneous Solution Pen-injector 1.8 mg sq daily Disp: 15 pen Rfl

## 2017-12-27 NOTE — TELEPHONE ENCOUNTER
For [facility] (out of office and per message below pt out of med), please advise regarding pended refill request as unable to refill per protocol d/t last A1C and creatinine being above protocol levels.     Diabetes Medications  Protocol Criteria:   · Appointment

## 2017-12-29 NOTE — TELEPHONE ENCOUNTER
Spoke with patient verbalized understanding of doctors ok to do blood work will be going in on Saturday.

## 2018-01-01 ENCOUNTER — HOSPITAL (OUTPATIENT)
Dept: OTHER | Age: 73
End: 2018-01-01
Attending: INTERNAL MEDICINE

## 2018-01-03 ENCOUNTER — LAB ENCOUNTER (OUTPATIENT)
Dept: LAB | Age: 73
End: 2018-01-03
Attending: FAMILY MEDICINE
Payer: MEDICARE

## 2018-01-03 ENCOUNTER — PRIOR ORIGINAL RECORDS (OUTPATIENT)
Dept: OTHER | Age: 73
End: 2018-01-03

## 2018-01-03 DIAGNOSIS — E11.9 NEW ONSET TYPE 2 DIABETES MELLITUS (HCC): ICD-10-CM

## 2018-01-03 DIAGNOSIS — N18.30 CKD (CHRONIC KIDNEY DISEASE), STAGE III (HCC): ICD-10-CM

## 2018-01-03 LAB
ALBUMIN SERPL BCP-MCNC: 3.6 G/DL (ref 3.5–4.8)
ALBUMIN/GLOB SERPL: 1.2 {RATIO} (ref 1–2)
ALP SERPL-CCNC: 77 U/L (ref 32–100)
ALT SERPL-CCNC: 16 U/L (ref 17–63)
ANION GAP SERPL CALC-SCNC: 14 MMOL/L (ref 0–18)
AST SERPL-CCNC: 24 U/L (ref 15–41)
BILIRUB SERPL-MCNC: 2.8 MG/DL (ref 0.3–1.2)
BUN SERPL-MCNC: 34 MG/DL (ref 8–20)
BUN/CREAT SERPL: 16.5 (ref 10–20)
CALCIUM SERPL-MCNC: 9.2 MG/DL (ref 8.5–10.5)
CHLORIDE SERPL-SCNC: 103 MMOL/L (ref 95–110)
CHOLEST SERPL-MCNC: 133 MG/DL (ref 110–200)
CO2 SERPL-SCNC: 26 MMOL/L (ref 22–32)
CREAT SERPL-MCNC: 2.06 MG/DL (ref 0.5–1.5)
CREAT UR-MCNC: 306.8 MG/DL
GLOBULIN PLAS-MCNC: 2.9 G/DL (ref 2.5–3.7)
GLUCOSE SERPL-MCNC: 96 MG/DL (ref 70–99)
HDLC SERPL-MCNC: 31 MG/DL
LDLC SERPL CALC-MCNC: 82 MG/DL (ref 0–99)
MICROALBUMIN UR-MCNC: 86.2 MG/DL (ref 0–1.8)
MICROALBUMIN/CREAT UR: 281 MG/G{CREAT} (ref 0–20)
NONHDLC SERPL-MCNC: 102 MG/DL
OSMOLALITY UR CALC.SUM OF ELEC: 303 MOSM/KG (ref 275–295)
PHOSPHATE SERPL-MCNC: 5.3 MG/DL (ref 2.4–4.7)
POTASSIUM SERPL-SCNC: 4.8 MMOL/L (ref 3.3–5.1)
PROT SERPL-MCNC: 6.5 G/DL (ref 5.9–8.4)
SODIUM SERPL-SCNC: 143 MMOL/L (ref 136–144)
TRIGL SERPL-MCNC: 101 MG/DL (ref 1–149)

## 2018-01-03 PROCEDURE — 83036 HEMOGLOBIN GLYCOSYLATED A1C: CPT

## 2018-01-03 PROCEDURE — 82043 UR ALBUMIN QUANTITATIVE: CPT

## 2018-01-03 PROCEDURE — 82570 ASSAY OF URINE CREATININE: CPT

## 2018-01-03 PROCEDURE — 80053 COMPREHEN METABOLIC PANEL: CPT

## 2018-01-03 PROCEDURE — 84100 ASSAY OF PHOSPHORUS: CPT

## 2018-01-03 PROCEDURE — 36415 COLL VENOUS BLD VENIPUNCTURE: CPT

## 2018-01-03 PROCEDURE — 80061 LIPID PANEL: CPT

## 2018-01-04 LAB — HBA1C MFR BLD: 7.9 % (ref 4–6)

## 2018-01-08 ENCOUNTER — OFFICE VISIT (OUTPATIENT)
Dept: FAMILY MEDICINE CLINIC | Facility: CLINIC | Age: 73
End: 2018-01-08

## 2018-01-08 ENCOUNTER — TELEPHONE (OUTPATIENT)
Dept: NEPHROLOGY | Facility: CLINIC | Age: 73
End: 2018-01-08

## 2018-01-08 VITALS
BODY MASS INDEX: 27 KG/M2 | WEIGHT: 166 LBS | TEMPERATURE: 98 F | HEART RATE: 87 BPM | DIASTOLIC BLOOD PRESSURE: 69 MMHG | OXYGEN SATURATION: 96 % | SYSTOLIC BLOOD PRESSURE: 106 MMHG

## 2018-01-08 DIAGNOSIS — E11.22 TYPE 2 DIABETES MELLITUS WITH STAGE 4 CHRONIC KIDNEY DISEASE, WITHOUT LONG-TERM CURRENT USE OF INSULIN (HCC): ICD-10-CM

## 2018-01-08 DIAGNOSIS — N18.4 TYPE 2 DIABETES MELLITUS WITH STAGE 4 CHRONIC KIDNEY DISEASE, WITHOUT LONG-TERM CURRENT USE OF INSULIN (HCC): ICD-10-CM

## 2018-01-08 DIAGNOSIS — R06.81 APNEA: Primary | ICD-10-CM

## 2018-01-08 DIAGNOSIS — I48.0 PAROXYSMAL ATRIAL FIBRILLATION (HCC): ICD-10-CM

## 2018-01-08 DIAGNOSIS — I50.23 ACUTE ON CHRONIC SYSTOLIC CONGESTIVE HEART FAILURE (HCC): ICD-10-CM

## 2018-01-08 DIAGNOSIS — N17.9 AKI (ACUTE KIDNEY INJURY) (HCC): Primary | ICD-10-CM

## 2018-01-08 DIAGNOSIS — I27.20 PULMONARY HYPERTENSION (HCC): ICD-10-CM

## 2018-01-08 DIAGNOSIS — I49.3 PVC (PREMATURE VENTRICULAR CONTRACTION): ICD-10-CM

## 2018-01-08 PROCEDURE — G0463 HOSPITAL OUTPT CLINIC VISIT: HCPCS | Performed by: FAMILY MEDICINE

## 2018-01-08 PROCEDURE — 99215 OFFICE O/P EST HI 40 MIN: CPT | Performed by: FAMILY MEDICINE

## 2018-01-08 RX ORDER — TORSEMIDE 20 MG/1
TABLET ORAL
Qty: 90 TABLET | Refills: 1 | Status: SHIPPED | OUTPATIENT
Start: 2018-01-08 | End: 2018-04-10

## 2018-01-08 NOTE — TELEPHONE ENCOUNTER
Worsening of renal function - pls find out about the weight and how much torsemide is he taking.  Repeat renal function test in one week - ordered

## 2018-01-08 NOTE — PROGRESS NOTES
Sob with ambulation  Leg swelling  No n/v  \"I can barely walk 10 feet. \"  Labs reviewed   Renal function worsening. Sugars 7+    Patient's past medical surgical family social history was reviewed.     Review of Systems  Allergic: no environmental allergie congestive heart failure (HCC)  Increase torsemide   - OP [facility] ALT REFERRAL HOME SLEEP APNEA TEST  - XR CHEST PA + LAT CHEST (CPT=71046); Future  - EKG 12-LEAD; Future  - CARDIO - INTERNAL    5.  Paroxysmal atrial fibrillation ([facility] [address]  F//u cards  - CARDIO - I

## 2018-01-09 NOTE — TELEPHONE ENCOUNTER
Contacted Dr. Kb Duggan to notify her of the below information. Per [facility], patient may take 40 mg of torsemide today and schedule appt for today at 3:15 PM or tomorrow at 11:30 AM. Contacted patient.  Notified him he may take the 2 tablets (40 mg) of torsemide

## 2018-01-09 NOTE — TELEPHONE ENCOUNTER
Contacted pt. Notified him that his kidney function is worsening. He hasn't been tracking weights but weighed himself while he was on the phone with me and he was 165 lb.  The weight that was recorded yesterday at Dr. Brendan Dixon' was just carried over from hi

## 2018-01-10 ENCOUNTER — OFFICE VISIT (OUTPATIENT)
Dept: NEPHROLOGY | Facility: CLINIC | Age: 73
End: 2018-01-10

## 2018-01-10 VITALS
TEMPERATURE: 98 F | SYSTOLIC BLOOD PRESSURE: 132 MMHG | HEIGHT: 66 IN | BODY MASS INDEX: 26.33 KG/M2 | WEIGHT: 163.81 LBS | DIASTOLIC BLOOD PRESSURE: 82 MMHG | HEART RATE: 60 BPM

## 2018-01-10 DIAGNOSIS — N18.30 CKD (CHRONIC KIDNEY DISEASE), STAGE III (HCC): ICD-10-CM

## 2018-01-10 DIAGNOSIS — N17.9 AKI (ACUTE KIDNEY INJURY) (HCC): Primary | ICD-10-CM

## 2018-01-10 DIAGNOSIS — R60.0 LOWER LEG EDEMA: ICD-10-CM

## 2018-01-10 PROCEDURE — G0463 HOSPITAL OUTPT CLINIC VISIT: HCPCS | Performed by: INTERNAL MEDICINE

## 2018-01-10 PROCEDURE — 99214 OFFICE O/P EST MOD 30 MIN: CPT | Performed by: INTERNAL MEDICINE

## 2018-01-10 NOTE — PATIENT INSTRUCTIONS
Increase torsemide to 20 mg daily dose   Call back next week with weight and leg swelling status   You need to start on allopurinol   Blood test next week   Follow up in 2 months

## 2018-01-10 NOTE — PROGRESS NOTES
Progress Note     Michael Marroquin is a 66 yrs old male with pmh of CKD stage III, DM II x 1 yr, CAD s/p PTCA and CABG x 4, A-fib on coumadin, CHF with EF 15-20%%, Mitral regurgitation s/p valve repair with clip, s/p ICD,  Ex tobacoo abuse x 20 yrs, Gout wh HISTORY      Comment: Quadruple bypass  No date: OTHER SURGICAL HISTORY      Comment: Nasal surgery        Medications (Active prior to today's visit):    Current Outpatient Prescriptions:  torsemide 20 MG Oral Tab 2 tabs daily Disp: 90 tablet Rfl: 1   ins disturbance  Psychiatric:  Negative for inappropriate interaction        01/10/18  1138   BP: 132/82   Pulse: 60   Temp: (!) 97.5 °F (36.4 °C)       PHYSICAL EXAM:     Constitutional: appears well hydrated alert and responsive no acute distress noted  Head encounter    Imaging & Referrals:  None     1/10/2018  Bob Alegria MD

## 2018-01-15 LAB
ALBUMIN: 3.6 G/DL
ALKALINE PHOSPHATATE(ALK PHOS): 77 IU/L
ALT (SGPT): 16 U/L
AST (SGOT): 24 U/L
BILIRUBIN TOTAL: 2.8 MG/DL
BUN: 34 MG/DL
CALCIUM: 9.2 MG/DL
CHLORIDE: 103 MEQ/L
CHOLESTEROL, TOTAL: 133 MG/DL
CREATININE, SERUM: 2.06 MG/DL
GLOBULIN: 2.9 G/DL
GLUCOSE: 96 MG/DL
GLUCOSE: 96 MG/DL
HDL CHOLESTEROL: 31 MG/DL
HEMOGLOBIN A1C: 7.9 %
LDL CHOLESTEROL: 82 MG/DL
NON-HDL CHOLESTEROL: 102 MG/DL
POTASSIUM, SERUM: 4.8 MEQ/L
PROTEIN, TOTAL: 6.5 G/DL
SGOT (AST): 24 IU/L
SGPT (ALT): 16 IU/L
SODIUM: 143 MEQ/L
TRIGLYCERIDES: 101 MG/DL

## 2018-01-16 ENCOUNTER — PRIOR ORIGINAL RECORDS (OUTPATIENT)
Dept: OTHER | Age: 73
End: 2018-01-16

## 2018-01-24 ENCOUNTER — PRIOR ORIGINAL RECORDS (OUTPATIENT)
Dept: OTHER | Age: 73
End: 2018-01-24

## 2018-01-27 LAB — INR BLDC: 2.6

## 2018-01-30 ENCOUNTER — PRIOR ORIGINAL RECORDS (OUTPATIENT)
Dept: OTHER | Age: 73
End: 2018-01-30

## 2018-02-01 RX ORDER — TORSEMIDE 20 MG/1
TABLET ORAL
Qty: 90 TABLET | Refills: 0 | Status: SHIPPED | OUTPATIENT
Start: 2018-02-01 | End: 2018-03-08

## 2018-02-12 ENCOUNTER — PRIOR ORIGINAL RECORDS (OUTPATIENT)
Dept: OTHER | Age: 73
End: 2018-02-12

## 2018-02-12 ENCOUNTER — APPOINTMENT (OUTPATIENT)
Dept: LAB | Age: 73
End: 2018-02-12
Attending: INTERNAL MEDICINE
Payer: MEDICARE

## 2018-02-12 DIAGNOSIS — N17.9 AKI (ACUTE KIDNEY INJURY) (HCC): ICD-10-CM

## 2018-02-12 DIAGNOSIS — I50.23 ACUTE ON CHRONIC SYSTOLIC CONGESTIVE HEART FAILURE (HCC): ICD-10-CM

## 2018-02-12 LAB
ALBUMIN SERPL BCP-MCNC: 3.6 G/DL (ref 3.5–4.8)
ANION GAP SERPL CALC-SCNC: 10 MMOL/L (ref 0–18)
BUN SERPL-MCNC: 37 MG/DL (ref 8–20)
BUN/CREAT SERPL: 18 (ref 10–20)
CALCIUM SERPL-MCNC: 9.1 MG/DL (ref 8.5–10.5)
CHLORIDE SERPL-SCNC: 100 MMOL/L (ref 95–110)
CO2 SERPL-SCNC: 29 MMOL/L (ref 22–32)
CREAT SERPL-MCNC: 2.05 MG/DL (ref 0.5–1.5)
GLUCOSE SERPL-MCNC: 160 MG/DL (ref 70–99)
OSMOLALITY UR CALC.SUM OF ELEC: 300 MOSM/KG (ref 275–295)
PHOSPHATE SERPL-MCNC: 3.8 MG/DL (ref 2.4–4.7)
POTASSIUM SERPL-SCNC: 4.3 MMOL/L (ref 3.3–5.1)
SODIUM SERPL-SCNC: 139 MMOL/L (ref 136–144)

## 2018-02-12 PROCEDURE — 80069 RENAL FUNCTION PANEL: CPT

## 2018-02-12 PROCEDURE — 36415 COLL VENOUS BLD VENIPUNCTURE: CPT

## 2018-02-20 ENCOUNTER — PRIOR ORIGINAL RECORDS (OUTPATIENT)
Dept: OTHER | Age: 73
End: 2018-02-20

## 2018-02-20 ENCOUNTER — APPOINTMENT (OUTPATIENT)
Dept: LAB | Age: 73
End: 2018-02-20
Attending: INTERNAL MEDICINE
Payer: MEDICARE

## 2018-02-20 ENCOUNTER — HOSPITAL ENCOUNTER (OUTPATIENT)
Dept: GENERAL RADIOLOGY | Age: 73
Discharge: HOME | End: 2018-02-20
Attending: FAMILY MEDICINE
Payer: MEDICARE

## 2018-02-20 DIAGNOSIS — M1A.20X0 CHRONIC GOUT DUE TO DRUG WITHOUT TOPHUS, UNSPECIFIED SITE: ICD-10-CM

## 2018-02-20 DIAGNOSIS — I50.23 ACUTE ON CHRONIC SYSTOLIC CONGESTIVE HEART FAILURE (HCC): ICD-10-CM

## 2018-02-20 DIAGNOSIS — I50.22 CHRONIC SYSTOLIC HEART FAILURE (HCC): ICD-10-CM

## 2018-02-20 LAB
ANION GAP SERPL CALC-SCNC: 11 MMOL/L (ref 0–18)
BNP SERPL-MCNC: 8232 PG/ML (ref 0–100)
BUN SERPL-MCNC: 30 MG/DL (ref 8–20)
BUN/CREAT SERPL: 16.5 (ref 10–20)
CALCIUM SERPL-MCNC: 9.4 MG/DL (ref 8.5–10.5)
CHLORIDE SERPL-SCNC: 97 MMOL/L (ref 95–110)
CO2 SERPL-SCNC: 32 MMOL/L (ref 22–32)
CREAT SERPL-MCNC: 1.82 MG/DL (ref 0.5–1.5)
GLUCOSE SERPL-MCNC: 137 MG/DL (ref 70–99)
OSMOLALITY UR CALC.SUM OF ELEC: 298 MOSM/KG (ref 275–295)
POTASSIUM SERPL-SCNC: 4.5 MMOL/L (ref 3.3–5.1)
SODIUM SERPL-SCNC: 140 MMOL/L (ref 136–144)
URATE SERPL-MCNC: 11.3 MG/DL (ref 3.3–8.7)

## 2018-02-20 PROCEDURE — 83880 ASSAY OF NATRIURETIC PEPTIDE: CPT

## 2018-02-20 PROCEDURE — 36415 COLL VENOUS BLD VENIPUNCTURE: CPT

## 2018-02-20 PROCEDURE — 71046 X-RAY EXAM CHEST 2 VIEWS: CPT | Performed by: FAMILY MEDICINE

## 2018-02-20 PROCEDURE — 80048 BASIC METABOLIC PNL TOTAL CA: CPT

## 2018-02-20 PROCEDURE — 84550 ASSAY OF BLOOD/URIC ACID: CPT

## 2018-02-21 ENCOUNTER — PRIOR ORIGINAL RECORDS (OUTPATIENT)
Dept: OTHER | Age: 73
End: 2018-02-21

## 2018-02-21 LAB
BNP: 8232 PMOL/L
BUN: 30 MG/DL
CALCIUM: 9.4 MG/DL
CHLORIDE: 97 MEQ/L
CREATININE, SERUM: 1.82 MG/DL
GLUCOSE: 137 MG/DL
POTASSIUM, SERUM: 4.5 MEQ/L
SODIUM: 140 MEQ/L
URIC ACID: 11.3 MG/DL

## 2018-02-23 ENCOUNTER — PRIOR ORIGINAL RECORDS (OUTPATIENT)
Dept: OTHER | Age: 73
End: 2018-02-23

## 2018-02-26 ENCOUNTER — HOSPITAL (OUTPATIENT)
Dept: OTHER | Age: 73
End: 2018-02-26
Attending: INTERNAL MEDICINE

## 2018-02-26 LAB — INR BLDC: 2.2

## 2018-02-27 ENCOUNTER — PRIOR ORIGINAL RECORDS (OUTPATIENT)
Dept: OTHER | Age: 73
End: 2018-02-27

## 2018-02-27 ENCOUNTER — TELEPHONE (OUTPATIENT)
Dept: FAMILY MEDICINE CLINIC | Facility: CLINIC | Age: 73
End: 2018-02-27

## 2018-02-27 NOTE — TELEPHONE ENCOUNTER
----- Message from Susanne Gonzalez RN sent at 2/21/2018  7:29 AM CST -----      ----- Message -----  From: Shefali Carpenter DO  Sent: 2/20/2018   7:20 PM  To: Em Rn Triage    No change in the chest x-ray

## 2018-03-01 ENCOUNTER — HOSPITAL (OUTPATIENT)
Dept: OTHER | Age: 73
End: 2018-03-01
Attending: INTERNAL MEDICINE

## 2018-03-08 ENCOUNTER — OFFICE VISIT (OUTPATIENT)
Dept: NEPHROLOGY | Facility: CLINIC | Age: 73
End: 2018-03-08

## 2018-03-08 VITALS
DIASTOLIC BLOOD PRESSURE: 66 MMHG | HEIGHT: 66 IN | TEMPERATURE: 97 F | SYSTOLIC BLOOD PRESSURE: 100 MMHG | WEIGHT: 168.19 LBS | BODY MASS INDEX: 27.03 KG/M2 | HEART RATE: 54 BPM

## 2018-03-08 DIAGNOSIS — N18.30 CKD (CHRONIC KIDNEY DISEASE), STAGE III (HCC): Primary | ICD-10-CM

## 2018-03-08 DIAGNOSIS — E79.0 ELEVATED URIC ACID IN BLOOD: ICD-10-CM

## 2018-03-08 PROCEDURE — G0463 HOSPITAL OUTPT CLINIC VISIT: HCPCS | Performed by: INTERNAL MEDICINE

## 2018-03-08 PROCEDURE — 99214 OFFICE O/P EST MOD 30 MIN: CPT | Performed by: INTERNAL MEDICINE

## 2018-03-08 RX ORDER — ATORVASTATIN CALCIUM 40 MG/1
40 TABLET, FILM COATED ORAL NIGHTLY
Refills: 3 | COMMUNITY
Start: 2018-03-04

## 2018-03-09 DIAGNOSIS — E10.8 TYPE 1 DIABETES MELLITUS WITH COMPLICATION (HCC): Primary | ICD-10-CM

## 2018-03-09 RX ORDER — LANCETS 30 GAUGE
EACH MISCELLANEOUS
Qty: 200 EACH | Refills: 0 | Status: SHIPPED | OUTPATIENT
Start: 2018-03-09

## 2018-03-09 RX ORDER — BLOOD SUGAR DIAGNOSTIC
STRIP MISCELLANEOUS
Qty: 200 STRIP | Refills: 0 | Status: SHIPPED | OUTPATIENT
Start: 2018-03-09

## 2018-03-09 RX ORDER — BLOOD-GLUCOSE METER
KIT MISCELLANEOUS
Qty: 1 EACH | Refills: 0 | Status: SHIPPED | OUTPATIENT
Start: 2018-03-09 | End: 2018-03-09

## 2018-03-09 RX ORDER — BLOOD-GLUCOSE METER
KIT MISCELLANEOUS
Qty: 1 EACH | Refills: 0 | Status: SHIPPED | OUTPATIENT
Start: 2018-03-09

## 2018-03-09 RX ORDER — LANCETS 30 GAUGE
EACH MISCELLANEOUS
Qty: 100 EACH | Refills: 0 | Status: SHIPPED | OUTPATIENT
Start: 2018-03-09 | End: 2018-03-09

## 2018-03-09 RX ORDER — BLOOD SUGAR DIAGNOSTIC
STRIP MISCELLANEOUS
Qty: 100 STRIP | Refills: 0 | Status: SHIPPED | OUTPATIENT
Start: 2018-03-09 | End: 2018-03-09

## 2018-03-09 NOTE — TELEPHONE ENCOUNTER
Daughter (not on HIPAA, patient there and gave permission to speak with daughter), stated that they found an old script for glucometer and testing supplies from 9/11/17, took to [place] but clarification needed. Called [facility].  New scripts created, p

## 2018-03-12 ENCOUNTER — PRIOR ORIGINAL RECORDS (OUTPATIENT)
Dept: OTHER | Age: 73
End: 2018-03-12

## 2018-03-16 ENCOUNTER — TELEPHONE (OUTPATIENT)
Dept: FAMILY MEDICINE CLINIC | Facility: CLINIC | Age: 73
End: 2018-03-16

## 2018-03-16 NOTE — TELEPHONE ENCOUNTER
Pt requesting to have [employer] form filled out & would like to come &  form on Monday 3/19 at [place].      Form placed in Dr. Shahram Chau in [place]

## 2018-03-17 ENCOUNTER — APPOINTMENT (OUTPATIENT)
Dept: LAB | Age: 73
End: 2018-03-17
Attending: INTERNAL MEDICINE
Payer: MEDICARE

## 2018-03-17 ENCOUNTER — PRIOR ORIGINAL RECORDS (OUTPATIENT)
Dept: OTHER | Age: 73
End: 2018-03-17

## 2018-03-17 DIAGNOSIS — N18.30 CKD (CHRONIC KIDNEY DISEASE), STAGE III (HCC): ICD-10-CM

## 2018-03-17 DIAGNOSIS — E79.0 ELEVATED URIC ACID IN BLOOD: ICD-10-CM

## 2018-03-17 DIAGNOSIS — I50.22 CHRONIC SYSTOLIC HEART FAILURE (HCC): ICD-10-CM

## 2018-03-17 LAB
ALBUMIN SERPL BCP-MCNC: 3.5 G/DL (ref 3.5–4.8)
ANION GAP SERPL CALC-SCNC: 11 MMOL/L (ref 0–18)
BUN SERPL-MCNC: 40 MG/DL (ref 8–20)
BUN/CREAT SERPL: 20.9 (ref 10–20)
CALCIUM SERPL-MCNC: 9.1 MG/DL (ref 8.5–10.5)
CHLORIDE SERPL-SCNC: 98 MMOL/L (ref 95–110)
CO2 SERPL-SCNC: 31 MMOL/L (ref 22–32)
CREAT SERPL-MCNC: 1.91 MG/DL (ref 0.5–1.5)
GLUCOSE SERPL-MCNC: 156 MG/DL (ref 70–99)
OSMOLALITY UR CALC.SUM OF ELEC: 303 MOSM/KG (ref 275–295)
PHOSPHATE SERPL-MCNC: 3.5 MG/DL (ref 2.4–4.7)
POTASSIUM SERPL-SCNC: 3.8 MMOL/L (ref 3.3–5.1)
SODIUM SERPL-SCNC: 140 MMOL/L (ref 136–144)
URATE SERPL-MCNC: 7.5 MG/DL (ref 3.3–8.7)

## 2018-03-17 PROCEDURE — 80069 RENAL FUNCTION PANEL: CPT

## 2018-03-17 PROCEDURE — 84550 ASSAY OF BLOOD/URIC ACID: CPT

## 2018-03-17 PROCEDURE — 36415 COLL VENOUS BLD VENIPUNCTURE: CPT

## 2018-03-20 ENCOUNTER — PRIOR ORIGINAL RECORDS (OUTPATIENT)
Dept: OTHER | Age: 73
End: 2018-03-20

## 2018-03-20 LAB
ALBUMIN: 3.5 G/DL
BUN: 40 MG/DL
CALCIUM: 9.1 MG/DL
CHLORIDE: 98 MEQ/L
CREATININE, SERUM: 1.91 MG/DL
GLUCOSE: 156 MG/DL
POTASSIUM, SERUM: 3.8 MEQ/L
SODIUM: 140 MEQ/L
URIC ACID: 7.5 MG/DL

## 2018-03-26 ENCOUNTER — PRIOR ORIGINAL RECORDS (OUTPATIENT)
Dept: OTHER | Age: 73
End: 2018-03-26

## 2018-03-26 LAB — INR BLDC: 2.4

## 2018-04-01 ENCOUNTER — HOSPITAL (OUTPATIENT)
Dept: OTHER | Age: 73
End: 2018-04-01
Attending: INTERNAL MEDICINE

## 2018-04-05 ENCOUNTER — PRIOR ORIGINAL RECORDS (OUTPATIENT)
Dept: OTHER | Age: 73
End: 2018-04-05

## 2018-04-05 ENCOUNTER — LAB ENCOUNTER (OUTPATIENT)
Dept: LAB | Age: 73
End: 2018-04-05
Attending: INTERNAL MEDICINE
Payer: MEDICARE

## 2018-04-05 DIAGNOSIS — I50.20 SYSTOLIC HEART FAILURE (HCC): Primary | ICD-10-CM

## 2018-04-05 PROCEDURE — 36415 COLL VENOUS BLD VENIPUNCTURE: CPT

## 2018-04-05 PROCEDURE — 80048 BASIC METABOLIC PNL TOTAL CA: CPT

## 2018-04-06 ENCOUNTER — PRIOR ORIGINAL RECORDS (OUTPATIENT)
Dept: OTHER | Age: 73
End: 2018-04-06

## 2018-04-06 LAB
BUN: 51 MG/DL
CALCIUM: 9.1 MG/DL
CHLORIDE: 101 MEQ/L
CREATININE, SERUM: 1.89 MG/DL
GLUCOSE: 189 MG/DL
POTASSIUM, SERUM: 4.2 MEQ/L
SODIUM: 142 MEQ/L

## 2018-04-09 ENCOUNTER — PRIOR ORIGINAL RECORDS (OUTPATIENT)
Dept: OTHER | Age: 73
End: 2018-04-09

## 2018-04-09 NOTE — PROGRESS NOTES
[address] Patient Status:  No patient class for patient encounter    1945 MRN [id number]   Location [address] [place] [address]  MD Charlie EldridgeAllegheny General Hospitalshobha Arriaza is a 74 year ol lantus insulin soon.      Review of Systems:  Constitutional: negative for chills or fever  Respiratory:  negative for cough, hemoptysis and wheezing  Cardiovascular: negative for chest pain,  near-syncope, orthopnea and palpitations  Gastrointestinal: nega rhythm  Abdomen: soft, non-tender; bowel sounds normal; no masses,  no organomegaly  Extremities: extremities normal, atraumatic, no cyanosis.  +2-3 yaron LE from below knee to pedal edema  Pulses: 2+ and symmetric  Neurologic: Grossly normal    Cardiographic IV furosemide 40 mg IV push x1 given for persistent fluid overload  -BNP was slightly improved to 7522 from 8232     History of ischemic cardiomyopathy with BiV ICD    Severe pulmonary hypertension    Paroxysmal atrial fibrillation on sotalol and warfarin Pen Needle (CARETOUCH PEN NEEDLES) 32G X 5 MM Does not apply Misc, To be used daily, Disp: 100 each, Rfl: 11  •  Liraglutide (VICTOZA) 18 MG/3ML Subcutaneous Solution Pen-injector, 1.8 mg sq daily, Disp: 15 pen, Rfl: 2  •  lisinopril 5 MG Oral Tab, Take 10

## 2018-04-10 ENCOUNTER — OFFICE VISIT (OUTPATIENT)
Dept: CARDIOLOGY CLINIC | Facility: HOSPITAL | Age: 73
End: 2018-04-10
Attending: INTERNAL MEDICINE
Payer: MEDICARE

## 2018-04-10 VITALS
HEART RATE: 65 BPM | WEIGHT: 167.19 LBS | BODY MASS INDEX: 27 KG/M2 | DIASTOLIC BLOOD PRESSURE: 66 MMHG | SYSTOLIC BLOOD PRESSURE: 103 MMHG | OXYGEN SATURATION: 96 %

## 2018-04-10 DIAGNOSIS — I25.5 ISCHEMIC CARDIOMYOPATHY: ICD-10-CM

## 2018-04-10 DIAGNOSIS — I50.43 ACUTE ON CHRONIC COMBINED SYSTOLIC AND DIASTOLIC CONGESTIVE HEART FAILURE, NYHA CLASS 2 (HCC): Primary | ICD-10-CM

## 2018-04-10 DIAGNOSIS — I48.0 PAROXYSMAL A-FIB (HCC): ICD-10-CM

## 2018-04-10 DIAGNOSIS — I34.0 MITRAL VALVE INSUFFICIENCY, UNSPECIFIED ETIOLOGY: ICD-10-CM

## 2018-04-10 DIAGNOSIS — I34.1 MITRAL VALVE ANTERIOR LEAFLET PROLAPSE: ICD-10-CM

## 2018-04-10 DIAGNOSIS — I50.9 HEART FAILURE, UNSPECIFIED (HCC): ICD-10-CM

## 2018-04-10 PROBLEM — I25.10 CORONARY ARTERY DISEASE INVOLVING NATIVE CORONARY ARTERY OF NATIVE HEART WITHOUT ANGINA PECTORIS: Status: ACTIVE | Noted: 2018-04-10

## 2018-04-10 PROCEDURE — 96374 THER/PROPH/DIAG INJ IV PUSH: CPT | Performed by: NURSE PRACTITIONER

## 2018-04-10 PROCEDURE — 83880 ASSAY OF NATRIURETIC PEPTIDE: CPT | Performed by: NURSE PRACTITIONER

## 2018-04-10 PROCEDURE — 80048 BASIC METABOLIC PNL TOTAL CA: CPT | Performed by: NURSE PRACTITIONER

## 2018-04-10 PROCEDURE — 99214 OFFICE O/P EST MOD 30 MIN: CPT | Performed by: NURSE PRACTITIONER

## 2018-04-10 PROCEDURE — 99212 OFFICE O/P EST SF 10 MIN: CPT | Performed by: NURSE PRACTITIONER

## 2018-04-10 PROCEDURE — 36415 COLL VENOUS BLD VENIPUNCTURE: CPT | Performed by: NURSE PRACTITIONER

## 2018-04-10 RX ORDER — ISOSORBIDE DINITRATE 20 MG/1
20 TABLET ORAL 3 TIMES DAILY
COMMUNITY
End: 2018-06-28 | Stop reason: ALTCHOICE

## 2018-04-10 RX ORDER — HYDRALAZINE HYDROCHLORIDE 10 MG/1
30 TABLET, FILM COATED ORAL 3 TIMES DAILY
COMMUNITY
End: 2018-06-28 | Stop reason: ALTCHOICE

## 2018-04-10 RX ORDER — TORSEMIDE 20 MG/1
TABLET ORAL
Qty: 90 TABLET | Refills: 1 | COMMUNITY
Start: 2018-04-10 | End: 2018-04-17

## 2018-04-10 NOTE — PATIENT INSTRUCTIONS
Do not take afternoon torsemide today, then tomorrow and Thursday take torsemide 20 mg - 2 tablets twice daily then resume one tablet twice daily     Continue all your same medications    Call if having any dizziness, lightheadedness, heart racing, palpita

## 2018-04-17 ENCOUNTER — OFFICE VISIT (OUTPATIENT)
Dept: CARDIOLOGY CLINIC | Facility: HOSPITAL | Age: 73
End: 2018-04-17
Attending: INTERNAL MEDICINE
Payer: MEDICARE

## 2018-04-17 VITALS
DIASTOLIC BLOOD PRESSURE: 69 MMHG | BODY MASS INDEX: 26 KG/M2 | WEIGHT: 164 LBS | SYSTOLIC BLOOD PRESSURE: 97 MMHG | HEART RATE: 94 BPM | OXYGEN SATURATION: 99 %

## 2018-04-17 DIAGNOSIS — I25.5 ISCHEMIC CARDIOMYOPATHY: ICD-10-CM

## 2018-04-17 DIAGNOSIS — I50.43 ACUTE ON CHRONIC COMBINED SYSTOLIC AND DIASTOLIC CONGESTIVE HEART FAILURE, NYHA CLASS 2 (HCC): Primary | ICD-10-CM

## 2018-04-17 DIAGNOSIS — I48.0 PAROXYSMAL A-FIB (HCC): ICD-10-CM

## 2018-04-17 DIAGNOSIS — I50.9 HEART FAILURE, UNSPECIFIED (HCC): ICD-10-CM

## 2018-04-17 PROCEDURE — 80048 BASIC METABOLIC PNL TOTAL CA: CPT | Performed by: NURSE PRACTITIONER

## 2018-04-17 PROCEDURE — 99214 OFFICE O/P EST MOD 30 MIN: CPT | Performed by: NURSE PRACTITIONER

## 2018-04-17 PROCEDURE — 96374 THER/PROPH/DIAG INJ IV PUSH: CPT | Performed by: NURSE PRACTITIONER

## 2018-04-17 PROCEDURE — 99212 OFFICE O/P EST SF 10 MIN: CPT | Performed by: NURSE PRACTITIONER

## 2018-04-17 PROCEDURE — 36415 COLL VENOUS BLD VENIPUNCTURE: CPT | Performed by: NURSE PRACTITIONER

## 2018-04-17 RX ORDER — TORSEMIDE 20 MG/1
40 TABLET ORAL 2 TIMES DAILY
Qty: 90 TABLET | Refills: 1 | COMMUNITY
Start: 2018-04-17 | End: 2018-07-06

## 2018-04-17 RX ORDER — POTASSIUM CHLORIDE 20 MEQ/1
TABLET, EXTENDED RELEASE ORAL
Status: COMPLETED
Start: 2018-04-17 | End: 2018-04-17

## 2018-04-17 RX ORDER — BUMETANIDE 0.25 MG/ML
1 INJECTION, SOLUTION INTRAMUSCULAR; INTRAVENOUS ONCE
Status: COMPLETED | OUTPATIENT
Start: 2018-04-17 | End: 2018-04-17

## 2018-04-17 RX ADMIN — BUMETANIDE 1 MG: 0.25 INJECTION, SOLUTION INTRAMUSCULAR; INTRAVENOUS at 16:10:00

## 2018-04-17 RX ADMIN — POTASSIUM CHLORIDE 20 MEQ: 20 TABLET, EXTENDED RELEASE ORAL at 16:20:00

## 2018-04-17 NOTE — PATIENT INSTRUCTIONS
Increase torsemide 20 mg tabs, take 2 tabs twice daily     Continue all your same medications    Call if having any dizziness, lightheadedness, heart racing, palpitations, chest pain, shortness of breath, coughing, swelling, weight gain, fever, chills or w

## 2018-04-17 NOTE — PROGRESS NOTES
[address] Patient Status:  No patient class for patient encounter    1945 MRN [id number]   Location [address] [place] [address]  MD Yovany Marmolejo Esperanza Armenta is a 74 year ol Systems:  Constitutional: negative for chills or fever  Respiratory:  negative for cough, hemoptysis and wheezing  Cardiovascular: negative for chest pain,  near-syncope, orthopnea and palpitations  Gastrointestinal: negative for abdominal pain, diarrhea, rate and rhythm  Abdomen: soft, non-tender; bowel sounds normal; no masses,  no organomegaly  Extremities: extremities normal, atraumatic, no cyanosis.  +2-3 yaron LE from below knee to pedal edema L>R  Pulses: 2+ and symmetric  Neurologic: Grossly normal LE edema. EF 17%.    - IV bumex 1  mg IV push x1 given for persistent fluid overload    History of ischemic cardiomyopathy with BiV ICD    Severe pulmonary hypertension    Paroxysmal atrial fibrillation on sotalol and warfarin    Mitral valve insufficiency MG/3ML Subcutaneous Solution Pen-injector, 1.8 mg sq daily, Disp: 15 pen, Rfl: 2  •  lisinopril 5 MG Oral Tab, Take 10 mg by mouth daily.   , Disp: , Rfl:   •  Insulin Pen Needle (PEN NEEDLES) 31G X 6 MM Does not apply Misc, To be used daily, Disp: 100 each

## 2018-04-23 ENCOUNTER — OFFICE VISIT (OUTPATIENT)
Dept: CARDIOLOGY CLINIC | Facility: HOSPITAL | Age: 73
End: 2018-04-23
Attending: INTERNAL MEDICINE
Payer: MEDICARE

## 2018-04-23 ENCOUNTER — PRIOR ORIGINAL RECORDS (OUTPATIENT)
Dept: OTHER | Age: 73
End: 2018-04-23

## 2018-04-23 ENCOUNTER — LAB ENCOUNTER (OUTPATIENT)
Dept: LAB | Age: 73
End: 2018-04-23
Attending: INTERNAL MEDICINE
Payer: MEDICARE

## 2018-04-23 VITALS
DIASTOLIC BLOOD PRESSURE: 61 MMHG | OXYGEN SATURATION: 99 % | BODY MASS INDEX: 26 KG/M2 | WEIGHT: 159.81 LBS | SYSTOLIC BLOOD PRESSURE: 90 MMHG | HEART RATE: 70 BPM

## 2018-04-23 DIAGNOSIS — I48.0 PAROXYSMAL A-FIB (HCC): ICD-10-CM

## 2018-04-23 DIAGNOSIS — I50.43 ACUTE ON CHRONIC COMBINED SYSTOLIC AND DIASTOLIC CONGESTIVE HEART FAILURE, NYHA CLASS 2 (HCC): Primary | ICD-10-CM

## 2018-04-23 DIAGNOSIS — I50.9 HEART FAILURE, UNSPECIFIED (HCC): ICD-10-CM

## 2018-04-23 DIAGNOSIS — I25.10 CAD (CORONARY ARTERY DISEASE): Primary | ICD-10-CM

## 2018-04-23 DIAGNOSIS — I25.5 ISCHEMIC CARDIOMYOPATHY: ICD-10-CM

## 2018-04-23 PROCEDURE — 80061 LIPID PANEL: CPT

## 2018-04-23 PROCEDURE — 82550 ASSAY OF CK (CPK): CPT

## 2018-04-23 PROCEDURE — 84460 ALANINE AMINO (ALT) (SGPT): CPT

## 2018-04-23 PROCEDURE — 80048 BASIC METABOLIC PNL TOTAL CA: CPT | Performed by: NURSE PRACTITIONER

## 2018-04-23 PROCEDURE — 99212 OFFICE O/P EST SF 10 MIN: CPT | Performed by: NURSE PRACTITIONER

## 2018-04-23 PROCEDURE — 84450 TRANSFERASE (AST) (SGOT): CPT

## 2018-04-23 PROCEDURE — 96374 THER/PROPH/DIAG INJ IV PUSH: CPT | Performed by: NURSE PRACTITIONER

## 2018-04-23 PROCEDURE — 99214 OFFICE O/P EST MOD 30 MIN: CPT | Performed by: NURSE PRACTITIONER

## 2018-04-23 PROCEDURE — 36415 COLL VENOUS BLD VENIPUNCTURE: CPT

## 2018-04-23 RX ORDER — BUMETANIDE 0.25 MG/ML
1 INJECTION, SOLUTION INTRAMUSCULAR; INTRAVENOUS ONCE
Status: COMPLETED | OUTPATIENT
Start: 2018-04-23 | End: 2018-04-23

## 2018-04-23 RX ADMIN — BUMETANIDE 1 MG: 0.25 INJECTION, SOLUTION INTRAMUSCULAR; INTRAVENOUS at 16:05:00

## 2018-04-23 NOTE — PATIENT INSTRUCTIONS
Increase torsemide 20 mg - take 2 tabs twice daily for the next week    Continue all your same medications    Call if having any dizziness, lightheadedness, heart racing, palpitations, chest pain, shortness of breath, coughing, swelling, weight gain, fever

## 2018-04-30 ENCOUNTER — OFFICE VISIT (OUTPATIENT)
Dept: CARDIOLOGY CLINIC | Facility: HOSPITAL | Age: 73
End: 2018-04-30
Attending: INTERNAL MEDICINE
Payer: MEDICARE

## 2018-04-30 VITALS
OXYGEN SATURATION: 99 % | BODY MASS INDEX: 26 KG/M2 | HEART RATE: 63 BPM | WEIGHT: 160.5 LBS | SYSTOLIC BLOOD PRESSURE: 122 MMHG | DIASTOLIC BLOOD PRESSURE: 66 MMHG

## 2018-04-30 DIAGNOSIS — N18.30 CKD (CHRONIC KIDNEY DISEASE) STAGE 3, GFR 30-59 ML/MIN (HCC): ICD-10-CM

## 2018-04-30 DIAGNOSIS — I50.43 ACUTE ON CHRONIC COMBINED SYSTOLIC AND DIASTOLIC CONGESTIVE HEART FAILURE, NYHA CLASS 2 (HCC): Primary | ICD-10-CM

## 2018-04-30 DIAGNOSIS — I25.5 ISCHEMIC CARDIOMYOPATHY: ICD-10-CM

## 2018-04-30 DIAGNOSIS — I48.0 PAROXYSMAL A-FIB (HCC): ICD-10-CM

## 2018-04-30 DIAGNOSIS — I34.0 MITRAL VALVE INSUFFICIENCY, UNSPECIFIED ETIOLOGY: ICD-10-CM

## 2018-04-30 DIAGNOSIS — I50.9 HEART FAILURE, UNSPECIFIED (HCC): ICD-10-CM

## 2018-04-30 LAB — INR BLDC: 3.3

## 2018-04-30 PROCEDURE — 96374 THER/PROPH/DIAG INJ IV PUSH: CPT | Performed by: NURSE PRACTITIONER

## 2018-04-30 PROCEDURE — 36415 COLL VENOUS BLD VENIPUNCTURE: CPT | Performed by: NURSE PRACTITIONER

## 2018-04-30 PROCEDURE — 80048 BASIC METABOLIC PNL TOTAL CA: CPT | Performed by: NURSE PRACTITIONER

## 2018-04-30 PROCEDURE — 99214 OFFICE O/P EST MOD 30 MIN: CPT | Performed by: NURSE PRACTITIONER

## 2018-04-30 PROCEDURE — 99212 OFFICE O/P EST SF 10 MIN: CPT | Performed by: NURSE PRACTITIONER

## 2018-04-30 RX ORDER — BUMETANIDE 0.25 MG/ML
INJECTION, SOLUTION INTRAMUSCULAR; INTRAVENOUS
Status: COMPLETED
Start: 2018-04-30 | End: 2018-04-30

## 2018-04-30 RX ORDER — METOLAZONE 5 MG/1
TABLET ORAL
Status: COMPLETED
Start: 2018-04-30 | End: 2018-04-30

## 2018-04-30 RX ADMIN — METOLAZONE 2.5 MG: 5 TABLET ORAL at 14:05:00

## 2018-04-30 RX ADMIN — BUMETANIDE 1 MG: 0.25 INJECTION, SOLUTION INTRAMUSCULAR; INTRAVENOUS at 14:20:00

## 2018-04-30 NOTE — PROGRESS NOTES
[address] Patient Status:  No patient class for patient encounter    1945 MRN [id number]   Location [address] [place] [address]  MD Odalis Obregon is a 74 year ol near-syncope, orthopnea and palpitations  Gastrointestinal: negative for abdominal pain, diarrhea, melena, nausea and vomiting  Hematologic/lymphatic: negative  Musculoskeletal: negative for muscle weakness and myalgias    Objective:    Lab Results  [place] cyanosis. +2 yaron LE from below knee to pedal edema L>R, softer, + 2 pitting.    Pulses: 2+ and symmetric  Neurologic: Grossly normal    Cardiographics:  EC18 ventricular paced, with pvc's  Echocardiogram: 18 15-20%,  grade 3 diastolic dysfuncti edema.  EF 17%. - metolazone 2.5 mg po X1 and IV bumex 1  mg IV push x1 given for persistent fluid overload  -consider cardiomems.     History of ischemic cardiomyopathy with BiV ICD    Severe pulmonary hypertension    Paroxysmal atrial fibrillation on so 32G X 5 MM Does not apply Misc, To be used daily, Disp: 100 each, Rfl: 11  •  Liraglutide (VICTOZA) 18 MG/3ML Subcutaneous Solution Pen-injector, 1.8 mg sq daily, Disp: 15 pen, Rfl: 2  •  lisinopril 5 MG Oral Tab, Take 10 mg by mouth daily.   , Disp: , Rfl:

## 2018-04-30 NOTE — PATIENT INSTRUCTIONS
Continue torsemide 20 mg - 2 tabs twice daily , in the morning and right after lunch    Continue all your same medications    Call if having any dizziness, lightheadedness, heart racing, palpitations, chest pain, shortness of breath, coughing, swelling, we

## 2018-05-08 ENCOUNTER — OFFICE VISIT (OUTPATIENT)
Dept: CARDIOLOGY CLINIC | Facility: HOSPITAL | Age: 73
End: 2018-05-08
Attending: INTERNAL MEDICINE
Payer: MEDICARE

## 2018-05-08 VITALS
WEIGHT: 148.31 LBS | BODY MASS INDEX: 24 KG/M2 | DIASTOLIC BLOOD PRESSURE: 60 MMHG | SYSTOLIC BLOOD PRESSURE: 111 MMHG | HEART RATE: 92 BPM | OXYGEN SATURATION: 98 %

## 2018-05-08 DIAGNOSIS — I50.9 HEART FAILURE, UNSPECIFIED (HCC): ICD-10-CM

## 2018-05-08 DIAGNOSIS — N18.30 CKD (CHRONIC KIDNEY DISEASE) STAGE 3, GFR 30-59 ML/MIN (HCC): ICD-10-CM

## 2018-05-08 DIAGNOSIS — I25.5 ISCHEMIC CARDIOMYOPATHY: ICD-10-CM

## 2018-05-08 DIAGNOSIS — I50.43 ACUTE ON CHRONIC COMBINED SYSTOLIC AND DIASTOLIC CONGESTIVE HEART FAILURE, NYHA CLASS 2 (HCC): Primary | ICD-10-CM

## 2018-05-08 PROCEDURE — 83880 ASSAY OF NATRIURETIC PEPTIDE: CPT | Performed by: NURSE PRACTITIONER

## 2018-05-08 PROCEDURE — 80048 BASIC METABOLIC PNL TOTAL CA: CPT | Performed by: NURSE PRACTITIONER

## 2018-05-08 PROCEDURE — 99212 OFFICE O/P EST SF 10 MIN: CPT | Performed by: NURSE PRACTITIONER

## 2018-05-08 PROCEDURE — 96374 THER/PROPH/DIAG INJ IV PUSH: CPT | Performed by: NURSE PRACTITIONER

## 2018-05-08 PROCEDURE — 36415 COLL VENOUS BLD VENIPUNCTURE: CPT | Performed by: NURSE PRACTITIONER

## 2018-05-08 PROCEDURE — 99214 OFFICE O/P EST MOD 30 MIN: CPT | Performed by: NURSE PRACTITIONER

## 2018-05-08 RX ORDER — BUMETANIDE 0.25 MG/ML
INJECTION, SOLUTION INTRAMUSCULAR; INTRAVENOUS
Status: DISCONTINUED
Start: 2018-05-08 | End: 2018-05-08

## 2018-05-08 NOTE — PATIENT INSTRUCTIONS
Continue torsemide 20 mg - 2 tablets twice daily     Continue all your same medications    Call if having any dizziness, lightheadedness, heart racing, palpitations, chest pain, shortness of breath, coughing, swelling, weight gain, fever, chills or worseni

## 2018-05-08 NOTE — PROGRESS NOTES
[address] Patient Status:  No patient class for patient encounter    1945 MRN [id number]   Location [address] [place] [address]  MD April Worley Courser Gabi Kalyn is a 74 year ol 08:54 AM   CREATSERUM 1.82 (H) 04/30/2018 01:21 PM   BUN 63 (H) 04/30/2018 01:21 PM    04/30/2018 01:21 PM   K 4.6 04/30/2018 01:21 PM   CL 99 04/30/2018 01:21 PM   CO2 29 04/30/2018 01:21 PM    (H) 04/30/2018 01:21 PM   CA 8.6 04/30/2018 01:2 MR, severe TR,  PA 45 mmhg    Education:  Patient instructed at length regarding clinic procedures, hours, purpose of clinic visits, sodium restricted diet, low sodium foods, fluid restrictions, daily weights, medication regimen s/s of atrial fib, heart fa for persistent fluid overload  -consider cardiomems.     History of ischemic cardiomyopathy with BiV ICD    Severe pulmonary hypertension    Paroxysmal atrial fibrillation on sotalol and warfarin    Mitral valve insufficiency s/p mitral valve clip- moderate Liraglutide (VICTOZA) 18 MG/3ML Subcutaneous Solution Pen-injector, 1.8 mg sq daily, Disp: 15 pen, Rfl: 2  •  lisinopril 5 MG Oral Tab, Take 10 mg by mouth daily.   , Disp: , Rfl:   •  Insulin Pen Needle (PEN NEEDLES) 31G X 6 MM Does not apply Misc, To be u

## 2018-05-16 ENCOUNTER — OFFICE VISIT (OUTPATIENT)
Dept: CARDIOLOGY CLINIC | Facility: HOSPITAL | Age: 73
End: 2018-05-16
Attending: INTERNAL MEDICINE
Payer: MEDICARE

## 2018-05-16 VITALS
WEIGHT: 137 LBS | OXYGEN SATURATION: 98 % | HEART RATE: 76 BPM | BODY MASS INDEX: 22 KG/M2 | DIASTOLIC BLOOD PRESSURE: 59 MMHG | SYSTOLIC BLOOD PRESSURE: 97 MMHG

## 2018-05-16 DIAGNOSIS — N18.30 CKD (CHRONIC KIDNEY DISEASE) STAGE 3, GFR 30-59 ML/MIN (HCC): ICD-10-CM

## 2018-05-16 DIAGNOSIS — I50.9 HEART FAILURE, UNSPECIFIED (HCC): ICD-10-CM

## 2018-05-16 DIAGNOSIS — I50.43 ACUTE ON CHRONIC COMBINED SYSTOLIC AND DIASTOLIC CONGESTIVE HEART FAILURE, NYHA CLASS 2 (HCC): Primary | ICD-10-CM

## 2018-05-16 DIAGNOSIS — I25.5 ISCHEMIC CARDIOMYOPATHY: ICD-10-CM

## 2018-05-16 PROCEDURE — 99212 OFFICE O/P EST SF 10 MIN: CPT | Performed by: NURSE PRACTITIONER

## 2018-05-16 PROCEDURE — 36415 COLL VENOUS BLD VENIPUNCTURE: CPT | Performed by: NURSE PRACTITIONER

## 2018-05-16 PROCEDURE — 99214 OFFICE O/P EST MOD 30 MIN: CPT | Performed by: NURSE PRACTITIONER

## 2018-05-16 PROCEDURE — 80048 BASIC METABOLIC PNL TOTAL CA: CPT | Performed by: NURSE PRACTITIONER

## 2018-05-16 NOTE — PROGRESS NOTES
[address] Patient Status:  No patient class for patient encounter    1945 MRN [id number]   Location [address] [place] [address]  MD Christina Vicentesachin Torres is a 74 year ol (H) 05/16/2018 03:21 PM   BUN 79 (H) 05/16/2018 03:21 PM    05/16/2018 03:21 PM   K 3.9 05/16/2018 03:21 PM   CL 91 (L) 05/16/2018 03:21 PM   CO2 35 (H) 05/16/2018 03:21 PM    (H) 05/16/2018 03:21 PM   CA 9.1 05/16/2018 03:21 PM   ALB 3.5 03/1 exacerbation and when to call APN/clinic. Greater than 40 minutes with this patient providing counseling, coordination of care and education given. Patient receptive.       Decision Making:   Here for follow up for referral for acute on chronic systolic hea ischemic cardiomyopathy with BiV ICD    Severe pulmonary hypertension    Paroxysmal atrial fibrillation on sotalol and warfarin    Mitral valve insufficiency s/p mitral valve clip- moderate MR on recent echo Jan 2018     Plan:  Continue torsemide 20 mg - 2 sq daily, Disp: 15 pen, Rfl: 2  •  lisinopril 5 MG Oral Tab, Take 10 mg by mouth daily.   , Disp: , Rfl:   •  Insulin Pen Needle (PEN NEEDLES) 31G X 6 MM Does not apply Misc, To be used daily, Disp: 100 each, Rfl: 3  •  carvedilol (COREG) 25 MG Oral Tab, Ta

## 2018-05-16 NOTE — PATIENT INSTRUCTIONS
Continue all your same medications    Call if having increased swelling, weight gain, shortness of breath,  dizziness, lightheadedness, heart racing, palpitations or chest pain     Weigh yourself daily in the morning before breakfast, call if gaining 3 lbs

## 2018-05-30 ENCOUNTER — PRIOR ORIGINAL RECORDS (OUTPATIENT)
Dept: OTHER | Age: 73
End: 2018-05-30

## 2018-05-30 ENCOUNTER — OFFICE VISIT (OUTPATIENT)
Dept: CARDIOLOGY CLINIC | Facility: HOSPITAL | Age: 73
End: 2018-05-30
Attending: INTERNAL MEDICINE
Payer: MEDICARE

## 2018-05-30 VITALS
HEART RATE: 76 BPM | DIASTOLIC BLOOD PRESSURE: 63 MMHG | OXYGEN SATURATION: 100 % | WEIGHT: 139 LBS | BODY MASS INDEX: 22 KG/M2 | SYSTOLIC BLOOD PRESSURE: 107 MMHG

## 2018-05-30 DIAGNOSIS — I48.0 PAROXYSMAL A-FIB (HCC): ICD-10-CM

## 2018-05-30 DIAGNOSIS — I50.43 ACUTE ON CHRONIC COMBINED SYSTOLIC AND DIASTOLIC CONGESTIVE HEART FAILURE, NYHA CLASS 2 (HCC): Primary | ICD-10-CM

## 2018-05-30 DIAGNOSIS — I50.9 HEART FAILURE, UNSPECIFIED (HCC): ICD-10-CM

## 2018-05-30 DIAGNOSIS — I25.5 ISCHEMIC CARDIOMYOPATHY: ICD-10-CM

## 2018-05-30 DIAGNOSIS — N18.30 CKD (CHRONIC KIDNEY DISEASE) STAGE 3, GFR 30-59 ML/MIN (HCC): ICD-10-CM

## 2018-05-30 PROCEDURE — 99214 OFFICE O/P EST MOD 30 MIN: CPT | Performed by: NURSE PRACTITIONER

## 2018-05-30 PROCEDURE — 36415 COLL VENOUS BLD VENIPUNCTURE: CPT | Performed by: NURSE PRACTITIONER

## 2018-05-30 PROCEDURE — 80048 BASIC METABOLIC PNL TOTAL CA: CPT | Performed by: NURSE PRACTITIONER

## 2018-05-30 PROCEDURE — 99212 OFFICE O/P EST SF 10 MIN: CPT | Performed by: NURSE PRACTITIONER

## 2018-05-30 PROCEDURE — 85610 PROTHROMBIN TIME: CPT | Performed by: NURSE PRACTITIONER

## 2018-05-30 NOTE — PATIENT INSTRUCTIONS
Take an additional torsemide 20 mg tab tomorrow ( 5-31)  and Friday (6-1) then continue torsemide 20 mg - 2 tabs twice daily , in the morning and right after lunch    Continue all your same medications    Call if having any dizziness, lightheadedness, hear

## 2018-05-30 NOTE — PROGRESS NOTES
[address] Patient Status:  No patient class for patient encounter    1945 MRN [id number]   Location [address] [place] [address]  MD Gina Peter Splinter Sourav Hardin is a 74 year ol 08/12/2017 08:54 AM   PLT 96 (L) 08/12/2017 08:54 AM   CREATSERUM 1.96 (H) 05/16/2018 03:21 PM   BUN 79 (H) 05/16/2018 03:21 PM    05/16/2018 03:21 PM   K 3.9 05/16/2018 03:21 PM   CL 91 (L) 05/16/2018 03:21 PM   CO2 35 (H) 05/16/2018 03:21 PM   GLU purpose of clinic visits, sodium restricted diet, low sodium foods, fluid restrictions, daily weights, medication regimen s/s of atrial fib, heart failure exacerbation and when to call APN/clinic.  Greater than 40 minutes with this patient providing  edema.  EF 17%.    -swelling greatly improved, weight down 26-28 lbs after  5 weeks of IV diuretics and 2 doses of metolazone  -mildly increased yaron LE swelling and 2-3 lb wt gain in the last week, no increased mishra  -take additional torsemide 20 mg tab X 2 sugar twice daily, fasting, Disp: 200 each, Rfl: 0  •  FREESTYLE SYSTEM Does not apply Kit, Test blood sugar twice daily, fasting, Disp: 1 each, Rfl: 0  •  atorvastatin 40 MG Oral Tab, Take 40 mg by mouth nightly., Disp: , Rfl: 3  •  insulin glargine (LANT

## 2018-05-31 ENCOUNTER — PRIOR ORIGINAL RECORDS (OUTPATIENT)
Dept: OTHER | Age: 73
End: 2018-05-31

## 2018-06-04 LAB
BUN: 48 MG/DL
BUN: 49 MG/DL
CALCIUM: 8.8 MG/DL
CALCIUM: 9.1 MG/DL
CHLORIDE: 100 MEQ/L
CHLORIDE: 97 MEQ/L
CHOLESTEROL, TOTAL: 82 MG/DL
CREATININE KINASE: 62 U/L
CREATININE, SERUM: 1.84 MG/DL
CREATININE, SERUM: 1.88 MG/DL
GLUCOSE: 108 MG/DL
GLUCOSE: 133 MG/DL
GLUCOSE: 133 MG/DL
HDL CHOLESTEROL: 33 MG/DL
LDL CHOLESTEROL: 37 MG/DL
NON-HDL CHOLESTEROL: 49 MG/DL
POTASSIUM, SERUM: 4 MEQ/L
POTASSIUM, SERUM: 5.2 MEQ/L
SODIUM: 139 MEQ/L
SODIUM: 144 MEQ/L
TRIGLYCERIDES: 62 MG/DL

## 2018-06-05 ENCOUNTER — PRIOR ORIGINAL RECORDS (OUTPATIENT)
Dept: OTHER | Age: 73
End: 2018-06-05

## 2018-06-07 ENCOUNTER — OFFICE VISIT (OUTPATIENT)
Dept: NEPHROLOGY | Facility: CLINIC | Age: 73
End: 2018-06-07

## 2018-06-07 VITALS
WEIGHT: 145 LBS | SYSTOLIC BLOOD PRESSURE: 93 MMHG | BODY MASS INDEX: 23.3 KG/M2 | DIASTOLIC BLOOD PRESSURE: 56 MMHG | TEMPERATURE: 98 F | HEIGHT: 66 IN

## 2018-06-07 DIAGNOSIS — N18.30 CKD (CHRONIC KIDNEY DISEASE), STAGE III (HCC): Primary | ICD-10-CM

## 2018-06-07 PROCEDURE — G0463 HOSPITAL OUTPT CLINIC VISIT: HCPCS | Performed by: INTERNAL MEDICINE

## 2018-06-07 PROCEDURE — 99214 OFFICE O/P EST MOD 30 MIN: CPT | Performed by: INTERNAL MEDICINE

## 2018-06-07 RX ORDER — COLCHICINE 0.6 MG/1
1 TABLET, FILM COATED ORAL 2 TIMES DAILY
Refills: 0 | COMMUNITY
Start: 2018-03-12 | End: 2018-09-04 | Stop reason: ALTCHOICE

## 2018-06-07 RX ORDER — HYDRALAZINE HYDROCHLORIDE AND ISOSORBIDE DINITRATE 37.5; 2 MG/1; MG/1
1 TABLET, FILM COATED ORAL 2 TIMES DAILY
Refills: 3 | COMMUNITY
Start: 2018-04-23

## 2018-06-07 NOTE — PROGRESS NOTES
Progress Note     Darrin Rawls is a 66 yrs old male with pmh of CKD stage III, DM II x 1 yr, CAD s/p PTCA and CABG x 4, A-fib on coumadin, CHF with EF 15-20%%, Mitral regurgitation s/p valve repair with clip, s/p ICD,  Ex tobacoo abuse x 20 yrs, gout wh Comment: mitral valve  2004: OTHER SURGICAL HISTORY      Comment: Quadruple bypass  No date: OTHER SURGICAL HISTORY      Comment: Nasal surgery        Medications (Active prior to today's visit):    Current Outpatient Prescriptions:  BIDIL 20-37.5 MG Oral Constitutional:  Negative for decreased activity, fever  ENMT:  Negative for ear drainage, hearing loss    Eyes:  Negative for eye discharge    Cardiovascular:  Negative for chest pain, sob, irregular heartbeat/palpitations  Respiratory:  Negative for cardiology  - renal US - right kidney 10.5 and left kidney 9.2 cm with normal echogenicity   - diagnosed wit DM x 1 yr. Goal Aic <7% - on insulin now - off oral meds    2.  CAD s/p CABG and PTCA/iCMP with EF 15-20% s/p Biv ICD, CHF/A-fib, severe pulmonary H

## 2018-06-20 ENCOUNTER — OFFICE VISIT (OUTPATIENT)
Dept: CARDIOLOGY CLINIC | Facility: HOSPITAL | Age: 73
End: 2018-06-20
Attending: NURSE PRACTITIONER
Payer: MEDICARE

## 2018-06-20 VITALS
SYSTOLIC BLOOD PRESSURE: 103 MMHG | HEART RATE: 80 BPM | WEIGHT: 154 LBS | BODY MASS INDEX: 25 KG/M2 | DIASTOLIC BLOOD PRESSURE: 63 MMHG | OXYGEN SATURATION: 100 %

## 2018-06-20 DIAGNOSIS — R60.0 BILATERAL LEG EDEMA: ICD-10-CM

## 2018-06-20 DIAGNOSIS — N18.30 CKD (CHRONIC KIDNEY DISEASE) STAGE 3, GFR 30-59 ML/MIN (HCC): ICD-10-CM

## 2018-06-20 DIAGNOSIS — I50.9 HEART FAILURE, UNSPECIFIED (HCC): ICD-10-CM

## 2018-06-20 DIAGNOSIS — I34.1 MITRAL VALVE ANTERIOR LEAFLET PROLAPSE: ICD-10-CM

## 2018-06-20 DIAGNOSIS — I50.43 ACUTE ON CHRONIC COMBINED SYSTOLIC AND DIASTOLIC CONGESTIVE HEART FAILURE, NYHA CLASS 2 (HCC): Primary | ICD-10-CM

## 2018-06-20 DIAGNOSIS — I25.5 ISCHEMIC CARDIOMYOPATHY: ICD-10-CM

## 2018-06-20 PROCEDURE — 36415 COLL VENOUS BLD VENIPUNCTURE: CPT | Performed by: NURSE PRACTITIONER

## 2018-06-20 PROCEDURE — 96374 THER/PROPH/DIAG INJ IV PUSH: CPT | Performed by: NURSE PRACTITIONER

## 2018-06-20 PROCEDURE — 99214 OFFICE O/P EST MOD 30 MIN: CPT | Performed by: NURSE PRACTITIONER

## 2018-06-20 PROCEDURE — 80048 BASIC METABOLIC PNL TOTAL CA: CPT | Performed by: NURSE PRACTITIONER

## 2018-06-20 PROCEDURE — 99212 OFFICE O/P EST SF 10 MIN: CPT | Performed by: NURSE PRACTITIONER

## 2018-06-20 RX ORDER — BUMETANIDE 0.25 MG/ML
INJECTION, SOLUTION INTRAMUSCULAR; INTRAVENOUS
Status: DISCONTINUED
Start: 2018-06-20 | End: 2018-06-20

## 2018-06-20 NOTE — PATIENT INSTRUCTIONS
Take an additional torsemide 20 mg tablet tomorrow and Friday then resume torsemide 20 mg–2 tabs twice daily    Continue all your same medications    Call if having any dizziness, lightheadedness, heart racing, palpitations, chest pain, shortness of breath

## 2018-06-20 NOTE — PROGRESS NOTES
[address] Patient Status:  No patient class for patient encounter    1945 MRN [id number]   Location [address] [place] [address]  MD Gilbert Louis is a 74 year ol 08/12/2017 08:54 AM   HCT 50.2 08/12/2017 08:54 AM   PLT 96 (L) 08/12/2017 08:54 AM   CREATSERUM 1.90 (H) 06/20/2018 03:28 PM   BUN 52 (H) 06/20/2018 03:28 PM    06/20/2018 03:28 PM   K 4.7 06/20/2018 03:28 PM    06/20/2018 03:28 PM   CO2 30 06 mmhg    Education:  Patient instructed at length regarding clinic procedures, hours, purpose of clinic visits, sodium restricted diet, low sodium foods, fluid restrictions, daily weights, medication regimen s/s of atrial fib, heart failure exacerbation and with pt and wife. Follow up with Dr. Lorena Duffy on June 25 th. Plan for repeat echo in 2 weeks to re look at the mitral valve. Assessment:  Acute on chronic systolic heart failure and significant bilateral LE edema.   EF 17%.   -15 lb wt gain and increase times daily. , Disp: 90 tablet, Rfl: 1  •  hydrALAzine HCl 10 MG Oral Tab, Take 30 mg by mouth 3 (three) times daily. , Disp: , Rfl:   •  isosorbide dinitrate 20 MG Oral Tab, Take 20 mg by mouth 3 (three) times daily. , Disp: , Rfl:   •  Glucose Blood (COOL B

## 2018-06-25 ENCOUNTER — PRIOR ORIGINAL RECORDS (OUTPATIENT)
Dept: OTHER | Age: 73
End: 2018-06-25

## 2018-06-28 ENCOUNTER — PRIOR ORIGINAL RECORDS (OUTPATIENT)
Dept: OTHER | Age: 73
End: 2018-06-28

## 2018-06-28 ENCOUNTER — OFFICE VISIT (OUTPATIENT)
Dept: CARDIOLOGY CLINIC | Facility: HOSPITAL | Age: 73
End: 2018-06-28
Attending: INTERNAL MEDICINE
Payer: MEDICARE

## 2018-06-28 VITALS
OXYGEN SATURATION: 99 % | HEART RATE: 62 BPM | SYSTOLIC BLOOD PRESSURE: 94 MMHG | WEIGHT: 149.63 LBS | BODY MASS INDEX: 24 KG/M2 | DIASTOLIC BLOOD PRESSURE: 53 MMHG

## 2018-06-28 DIAGNOSIS — I25.5 ISCHEMIC CARDIOMYOPATHY: ICD-10-CM

## 2018-06-28 DIAGNOSIS — I50.9 HEART FAILURE, UNSPECIFIED (HCC): ICD-10-CM

## 2018-06-28 DIAGNOSIS — N18.30 CKD (CHRONIC KIDNEY DISEASE) STAGE 3, GFR 30-59 ML/MIN (HCC): ICD-10-CM

## 2018-06-28 DIAGNOSIS — I50.43 ACUTE ON CHRONIC COMBINED SYSTOLIC AND DIASTOLIC CONGESTIVE HEART FAILURE, NYHA CLASS 2 (HCC): Primary | ICD-10-CM

## 2018-06-28 DIAGNOSIS — I48.0 PAROXYSMAL A-FIB (HCC): ICD-10-CM

## 2018-06-28 PROCEDURE — 99212 OFFICE O/P EST SF 10 MIN: CPT | Performed by: NURSE PRACTITIONER

## 2018-06-28 PROCEDURE — 36415 COLL VENOUS BLD VENIPUNCTURE: CPT | Performed by: NURSE PRACTITIONER

## 2018-06-28 PROCEDURE — 99214 OFFICE O/P EST MOD 30 MIN: CPT | Performed by: NURSE PRACTITIONER

## 2018-06-28 PROCEDURE — 96374 THER/PROPH/DIAG INJ IV PUSH: CPT | Performed by: NURSE PRACTITIONER

## 2018-06-28 PROCEDURE — 80048 BASIC METABOLIC PNL TOTAL CA: CPT | Performed by: NURSE PRACTITIONER

## 2018-06-28 RX ORDER — BUMETANIDE 0.25 MG/ML
INJECTION, SOLUTION INTRAMUSCULAR; INTRAVENOUS
Status: COMPLETED
Start: 2018-06-28 | End: 2018-06-28

## 2018-06-28 RX ORDER — POTASSIUM CHLORIDE 20 MEQ/1
TABLET, EXTENDED RELEASE ORAL
Status: COMPLETED
Start: 2018-06-28 | End: 2018-06-28

## 2018-06-28 RX ADMIN — BUMETANIDE 1 MG: 0.25 INJECTION, SOLUTION INTRAMUSCULAR; INTRAVENOUS at 12:23:00

## 2018-06-28 RX ADMIN — POTASSIUM CHLORIDE 20 MEQ: 20 TABLET, EXTENDED RELEASE ORAL at 12:29:00

## 2018-06-28 NOTE — PATIENT INSTRUCTIONS
Do not take afternoon dose of torsemide today then take torsemide 20 mg tab, take 3 tabs in the morning and 2 tabs in the afternoon for 2 days on Friday and Saturday then decrease to 2 tab twice daily starting Sunday 7-1-18    Begin entresto 24-26 mg one t

## 2018-06-28 NOTE — PROGRESS NOTES
[address] Patient Status:  No patient class for patient encounter    1945 MRN [id number]   Location [address] [place] [address]  MD Katrina Hussein is a 74 year ol negative  Musculoskeletal: negative for muscle weakness and myalgias    Objective:    Lab Results  Component Value Date/Time   WBC 4.9 08/12/2017 08:54 AM   HGB 16.3 08/12/2017 08:54 AM   HCT 50.2 08/12/2017 08:54 AM   PLT 96 (L) 08/12/2017 08:54 AM   CREA paced, with pvc's  Echocardiogram: 1-23-18 15-20%,  grade 3 diastolic dysfunction,  Severe L atrial enlargement, mitral clip with mod MR, severe TR,  PA 45 mmhg    Education:  Patient instructed at length regarding clinic procedures, hours, purpose of clin and call with wt gain, increased fatigue, sob, chest pressure, dizziness, swelling or bloating. Follow up with the HF on 7-6- with repeat bmp. Reinforced 2 gm sodium restricted diet and fluid restriction at 48-64 oz/day.  Consider cardiomems as option for 2000 mg sodium restricted , heart healthy diet, Keep daily fluids at 48-64 ounces per day    Follow up with Dr. Isabella Armenta nurse practitioner Saúl Dasilva on July 9th     Follow up with the specialty care clinic on July 6 th at 2: 30 pm         Current Outp (COUMADIN) 5 MG Oral Tab, Take 1 tablet by mouth daily.   , Disp: , Rfl: [address] [facility] Ramses Hess NP  6/28/2018

## 2018-07-02 ENCOUNTER — PRIOR ORIGINAL RECORDS (OUTPATIENT)
Dept: OTHER | Age: 73
End: 2018-07-02

## 2018-07-03 LAB
BUN: 40 MG/DL
CALCIUM: 8.8 MG/DL
CHLORIDE: 97 MEQ/L
CREATININE, SERUM: 1.68 MG/DL
GLUCOSE: 170 MG/DL
POTASSIUM, SERUM: 3.7 MEQ/L
SODIUM: 139 MEQ/L

## 2018-07-06 ENCOUNTER — OFFICE VISIT (OUTPATIENT)
Dept: CARDIOLOGY CLINIC | Facility: HOSPITAL | Age: 73
End: 2018-07-06
Attending: INTERNAL MEDICINE
Payer: MEDICARE

## 2018-07-06 VITALS
BODY MASS INDEX: 25 KG/M2 | DIASTOLIC BLOOD PRESSURE: 58 MMHG | WEIGHT: 157 LBS | SYSTOLIC BLOOD PRESSURE: 85 MMHG | HEART RATE: 65 BPM

## 2018-07-06 DIAGNOSIS — I50.43 ACUTE ON CHRONIC COMBINED SYSTOLIC AND DIASTOLIC CONGESTIVE HEART FAILURE, NYHA CLASS 2 (HCC): Primary | Chronic | ICD-10-CM

## 2018-07-06 DIAGNOSIS — N18.30 CKD (CHRONIC KIDNEY DISEASE) STAGE 3, GFR 30-59 ML/MIN (HCC): ICD-10-CM

## 2018-07-06 DIAGNOSIS — I34.0 MITRAL VALVE INSUFFICIENCY, UNSPECIFIED ETIOLOGY: ICD-10-CM

## 2018-07-06 DIAGNOSIS — I50.9 HEART FAILURE, UNSPECIFIED (HCC): ICD-10-CM

## 2018-07-06 LAB
ANION GAP SERPL CALC-SCNC: 9 MMOL/L (ref 0–18)
BASOPHILS # BLD: 0 K/UL (ref 0–0.2)
BASOPHILS NFR BLD: 1 %
BNP SERPL-MCNC: 9592 PG/ML (ref 0–100)
BUN SERPL-MCNC: 56 MG/DL (ref 8–20)
BUN/CREAT SERPL: 31.1 (ref 10–20)
CALCIUM SERPL-MCNC: 8.5 MG/DL (ref 8.5–10.5)
CHLORIDE SERPL-SCNC: 101 MMOL/L (ref 95–110)
CO2 SERPL-SCNC: 29 MMOL/L (ref 22–32)
CREAT SERPL-MCNC: 1.8 MG/DL (ref 0.5–1.5)
EOSINOPHIL # BLD: 0 K/UL (ref 0–0.7)
EOSINOPHIL NFR BLD: 1 %
ERYTHROCYTE [DISTWIDTH] IN BLOOD BY AUTOMATED COUNT: 19.6 % (ref 11–15)
GLUCOSE SERPL-MCNC: 184 MG/DL (ref 70–99)
HCT VFR BLD AUTO: 42.4 % (ref 41–52)
HGB BLD-MCNC: 13.7 G/DL (ref 13.5–17.5)
LYMPHOCYTES # BLD: 0.5 K/UL (ref 1–4)
LYMPHOCYTES NFR BLD: 12 %
MCH RBC QN AUTO: 32.3 PG (ref 27–32)
MCHC RBC AUTO-ENTMCNC: 32.3 G/DL (ref 32–37)
MCV RBC AUTO: 99.9 FL (ref 80–100)
MONOCYTES # BLD: 0.4 K/UL (ref 0–1)
MONOCYTES NFR BLD: 10 %
NEUTROPHILS # BLD AUTO: 3.6 K/UL (ref 1.8–7.7)
NEUTROPHILS NFR BLD: 77 %
OSMOLALITY UR CALC.SUM OF ELEC: 308 MOSM/KG (ref 275–295)
PLATELET # BLD AUTO: 100 K/UL (ref 140–400)
PMV BLD AUTO: 10.4 FL (ref 7.4–10.3)
POTASSIUM SERPL-SCNC: 4.5 MMOL/L (ref 3.3–5.1)
RBC # BLD AUTO: 4.24 M/UL (ref 4.5–5.9)
SODIUM SERPL-SCNC: 139 MMOL/L (ref 136–144)
WBC # BLD AUTO: 4.7 K/UL (ref 4–11)

## 2018-07-06 PROCEDURE — 85025 COMPLETE CBC W/AUTO DIFF WBC: CPT | Performed by: NURSE PRACTITIONER

## 2018-07-06 PROCEDURE — 96374 THER/PROPH/DIAG INJ IV PUSH: CPT | Performed by: NURSE PRACTITIONER

## 2018-07-06 PROCEDURE — 83880 ASSAY OF NATRIURETIC PEPTIDE: CPT | Performed by: NURSE PRACTITIONER

## 2018-07-06 PROCEDURE — 99211 OFF/OP EST MAY X REQ PHY/QHP: CPT | Performed by: NURSE PRACTITIONER

## 2018-07-06 PROCEDURE — 80048 BASIC METABOLIC PNL TOTAL CA: CPT | Performed by: NURSE PRACTITIONER

## 2018-07-06 PROCEDURE — 99214 OFFICE O/P EST MOD 30 MIN: CPT | Performed by: NURSE PRACTITIONER

## 2018-07-06 PROCEDURE — 93010 ELECTROCARDIOGRAM REPORT: CPT | Performed by: NURSE PRACTITIONER

## 2018-07-06 PROCEDURE — 93005 ELECTROCARDIOGRAM TRACING: CPT

## 2018-07-06 PROCEDURE — 36415 COLL VENOUS BLD VENIPUNCTURE: CPT | Performed by: NURSE PRACTITIONER

## 2018-07-06 RX ORDER — BUMETANIDE 0.25 MG/ML
INJECTION, SOLUTION INTRAMUSCULAR; INTRAVENOUS
Status: COMPLETED
Start: 2018-07-06 | End: 2018-07-06

## 2018-07-06 RX ORDER — TORSEMIDE 20 MG/1
40 TABLET ORAL SEE ADMIN INSTRUCTIONS
Qty: 90 TABLET | Refills: 1 | Status: SHIPPED | OUTPATIENT
Start: 2018-07-06 | End: 2018-07-12

## 2018-07-06 RX ADMIN — BUMETANIDE 1 MG: 0.25 INJECTION, SOLUTION INTRAMUSCULAR; INTRAVENOUS at 15:48:00

## 2018-07-06 NOTE — PROGRESS NOTES
[address] Patient Status:  No patient class for patient encounter    1945 MRN [id number]   Location [address] [place] [address]  MD Elda Garcia is a 74 year ol diarrhea, melena, nausea and vomiting  Hematologic/lymphatic: negative  Musculoskeletal: negative for muscle weakness and myalgias    Objective:    Lab Results  Component Value Date/Time   WBC 4.9 08/12/2017 08:54 AM   HGB 16.3 08/12/2017 08:54 AM   HCT 50 Grossly normal    Cardiographics:  EC18 ventricular paced, with pvc's  Echocardiogram: 18 15-20%,  grade 3 diastolic dysfunction,  Severe L atrial enlargement, mitral clip with mod MR, severe TR,  PA 45 mmhg    Education:  Patient instructed a dizziness, swelling or bloating. Follow up with the HF on 7-12- with repeat bmp. Reinforced 2 gm sodium restricted diet and fluid restriction at 48-64 oz/day.  Consider cardiomems as option for fluid management, discussed procedure and risks, benefits wit clinic on July 12 th at 6: 30 am         Current Outpatient Prescriptions:   •  Sacubitril-Valsartan (ENTRESTO) 24-26 MG Oral Tab, Take 1 tablet by mouth 2 (two) times daily. , Disp: , Rfl:   •  COLCRYS 0.6 MG Oral Tab, Take 1 tablet by mouth 2 (two) times

## 2018-07-06 NOTE — PATIENT INSTRUCTIONS
Take 3 tabs in the morning and 2 tabs in the afternoon for 3 days on Saturday, Sunday and Monday then decrease to 2 tab twice daily starting Casey 7-10-18    Continue entresto 24-26 mg one tab twice daily     Continue all your same medications    Call if

## 2018-07-09 ENCOUNTER — PRIOR ORIGINAL RECORDS (OUTPATIENT)
Dept: OTHER | Age: 73
End: 2018-07-09

## 2018-07-12 ENCOUNTER — OFFICE VISIT (OUTPATIENT)
Dept: FAMILY MEDICINE CLINIC | Facility: CLINIC | Age: 73
End: 2018-07-12

## 2018-07-12 ENCOUNTER — OFFICE VISIT (OUTPATIENT)
Dept: CARDIOLOGY CLINIC | Facility: HOSPITAL | Age: 73
End: 2018-07-12
Attending: INTERNAL MEDICINE
Payer: MEDICARE

## 2018-07-12 ENCOUNTER — TELEPHONE (OUTPATIENT)
Dept: FAMILY MEDICINE CLINIC | Facility: CLINIC | Age: 73
End: 2018-07-12

## 2018-07-12 VITALS
BODY MASS INDEX: 24.11 KG/M2 | DIASTOLIC BLOOD PRESSURE: 57 MMHG | HEART RATE: 78 BPM | TEMPERATURE: 98 F | SYSTOLIC BLOOD PRESSURE: 96 MMHG | HEIGHT: 66 IN | WEIGHT: 150 LBS

## 2018-07-12 VITALS
DIASTOLIC BLOOD PRESSURE: 46 MMHG | OXYGEN SATURATION: 99 % | HEART RATE: 67 BPM | BODY MASS INDEX: 24 KG/M2 | WEIGHT: 151 LBS | SYSTOLIC BLOOD PRESSURE: 87 MMHG

## 2018-07-12 DIAGNOSIS — I50.9 HEART FAILURE, UNSPECIFIED (HCC): ICD-10-CM

## 2018-07-12 DIAGNOSIS — I50.43 ACUTE ON CHRONIC COMBINED SYSTOLIC AND DIASTOLIC CONGESTIVE HEART FAILURE, NYHA CLASS 2 (HCC): Primary | Chronic | ICD-10-CM

## 2018-07-12 DIAGNOSIS — S81.819A: Primary | ICD-10-CM

## 2018-07-12 DIAGNOSIS — L03.115 CELLULITIS OF RIGHT LOWER LEG: ICD-10-CM

## 2018-07-12 DIAGNOSIS — Z23 NEED FOR VACCINATION: Primary | ICD-10-CM

## 2018-07-12 LAB
ANION GAP SERPL CALC-SCNC: 10 MMOL/L (ref 0–18)
BUN SERPL-MCNC: 63 MG/DL (ref 8–20)
BUN/CREAT SERPL: 35 (ref 10–20)
CALCIUM SERPL-MCNC: 8.9 MG/DL (ref 8.5–10.5)
CHLORIDE SERPL-SCNC: 99 MMOL/L (ref 95–110)
CO2 SERPL-SCNC: 31 MMOL/L (ref 22–32)
CREAT SERPL-MCNC: 1.8 MG/DL (ref 0.5–1.5)
GLUCOSE SERPL-MCNC: 179 MG/DL (ref 70–99)
OSMOLALITY UR CALC.SUM OF ELEC: 312 MOSM/KG (ref 275–295)
POTASSIUM SERPL-SCNC: 3.6 MMOL/L (ref 3.3–5.1)
SODIUM SERPL-SCNC: 140 MMOL/L (ref 136–144)

## 2018-07-12 PROCEDURE — 80048 BASIC METABOLIC PNL TOTAL CA: CPT | Performed by: NURSE PRACTITIONER

## 2018-07-12 PROCEDURE — 99214 OFFICE O/P EST MOD 30 MIN: CPT | Performed by: FAMILY MEDICINE

## 2018-07-12 PROCEDURE — 90715 TDAP VACCINE 7 YRS/> IM: CPT | Performed by: FAMILY MEDICINE

## 2018-07-12 PROCEDURE — 36415 COLL VENOUS BLD VENIPUNCTURE: CPT | Performed by: NURSE PRACTITIONER

## 2018-07-12 PROCEDURE — 99212 OFFICE O/P EST SF 10 MIN: CPT | Performed by: NURSE PRACTITIONER

## 2018-07-12 PROCEDURE — 99214 OFFICE O/P EST MOD 30 MIN: CPT | Performed by: NURSE PRACTITIONER

## 2018-07-12 PROCEDURE — 90471 IMMUNIZATION ADMIN: CPT | Performed by: FAMILY MEDICINE

## 2018-07-12 PROCEDURE — 96372 THER/PROPH/DIAG INJ SC/IM: CPT | Performed by: FAMILY MEDICINE

## 2018-07-12 PROCEDURE — G0463 HOSPITAL OUTPT CLINIC VISIT: HCPCS | Performed by: FAMILY MEDICINE

## 2018-07-12 RX ORDER — CEPHALEXIN 250 MG/1
250 CAPSULE ORAL 3 TIMES DAILY
Qty: 30 CAPSULE | Refills: 0 | Status: SHIPPED | OUTPATIENT
Start: 2018-07-12 | End: 2018-07-31

## 2018-07-12 RX ORDER — METOLAZONE 2.5 MG/1
2.5 TABLET ORAL SEE ADMIN INSTRUCTIONS
COMMUNITY
End: 2018-07-18

## 2018-07-12 RX ORDER — TORSEMIDE 20 MG/1
40 TABLET ORAL 2 TIMES DAILY
COMMUNITY

## 2018-07-12 RX ORDER — CEFTRIAXONE 1 G/1
1000 INJECTION, POWDER, FOR SOLUTION INTRAMUSCULAR; INTRAVENOUS ONCE
Status: COMPLETED | OUTPATIENT
Start: 2018-07-12 | End: 2018-07-12

## 2018-07-12 RX ADMIN — CEFTRIAXONE 1000 MG: 1 INJECTION, POWDER, FOR SOLUTION INTRAMUSCULAR; INTRAVENOUS at 14:41:00

## 2018-07-12 NOTE — TELEPHONE ENCOUNTER
Attempt made to contact patient; no answer left message instructing patient to return call to clinic. Keflex could not be pended since medications and orders is currently in read mode only.  Message sent to John Ortiz NP to confirm if she will be placing th

## 2018-07-12 NOTE — PROGRESS NOTES
3 days of redness  Hurt his right shin on car door and then had redenss and skin tear. No recent tetnus  Spoke to petty Marino earlier today and pt has purched but not taken the keflex.     Here with wife    Exam  Ht regular with pvcs  Lungs clear  abd soft  E

## 2018-07-12 NOTE — PROGRESS NOTES
[address] Patient Status:  No patient class for patient encounter    1945 MRN [id number]   Location [address] [place] [address]  Stormy Duverney, MD Doug Hurdle Cleneil Dowling is a 74 year ol for abdominal pain, diarrhea, melena, nausea and vomiting  Hematologic/lymphatic: negative  Musculoskeletal: negative for muscle weakness and myalgias    Objective:    Lab Results  Component Value Date/Time   WBC 4.7 07/06/2018 02:41 PM   HGB 13.7 07/06/20 improved, right lower leg blistering, erythema and warmth on mid-lateral aspect of right shin  Pulses: 2+ and symmetric  Neurologic: Grossly normal    Cardiographics:  EC18 ventricular paced, with pvc's  Echocardiogram: 18 15-20%,  grade 3 oscar however will continue metolazone 2.5 mg on Sunday, Tuesday and Thursday x 1 week. Will monitor renal function closely as patient is now on Keflex as well. Will not increase Entresto today as BP is borderline and patient starting on Keflex.  Will reassess a Tuesday and Thursday    Continue Torsemide 40 mg twice daily    Continue entresto 24-26 mg one tab twice daily     Call if having any dizziness, lightheadedness, heart racing, palpitations, chest pain, shortness of breath, coughing, swelling, weight gain, MM Does not apply Misc, To be used daily, Disp: 100 each, Rfl: 3  •  carvedilol (COREG) 25 MG Oral Tab, Take 25 mg by mouth 2 (two) times daily. , Disp: , Rfl:   •  Sotalol HCl (BETAPACE) 80 MG Oral Tab, Take 1 tablet by mouth 2 (two) times daily. , Disp:

## 2018-07-13 NOTE — TELEPHONE ENCOUNTER
Per [facility] Amalia Land NP ordered Keflex as directed by Dr Kunal Calderon. Pt scheduled OV 7/17/18 with Dr Kunal Calderon for f/u.

## 2018-07-17 ENCOUNTER — OFFICE VISIT (OUTPATIENT)
Dept: FAMILY MEDICINE CLINIC | Facility: CLINIC | Age: 73
End: 2018-07-17

## 2018-07-17 VITALS
WEIGHT: 153.13 LBS | HEIGHT: 66 IN | BODY MASS INDEX: 24.61 KG/M2 | HEART RATE: 85 BPM | SYSTOLIC BLOOD PRESSURE: 110 MMHG | DIASTOLIC BLOOD PRESSURE: 67 MMHG

## 2018-07-17 DIAGNOSIS — L02.415 CELLULITIS AND ABSCESS OF RIGHT LEG: ICD-10-CM

## 2018-07-17 DIAGNOSIS — S81.819A: Primary | ICD-10-CM

## 2018-07-17 DIAGNOSIS — R60.0 EDEMA OF BOTH FEET: ICD-10-CM

## 2018-07-17 DIAGNOSIS — I50.23 ACUTE ON CHRONIC SYSTOLIC CONGESTIVE HEART FAILURE (HCC): ICD-10-CM

## 2018-07-17 DIAGNOSIS — L03.115 CELLULITIS AND ABSCESS OF RIGHT LEG: ICD-10-CM

## 2018-07-17 PROCEDURE — 99213 OFFICE O/P EST LOW 20 MIN: CPT | Performed by: FAMILY MEDICINE

## 2018-07-17 PROCEDURE — G0463 HOSPITAL OUTPT CLINIC VISIT: HCPCS | Performed by: FAMILY MEDICINE

## 2018-07-17 NOTE — PROGRESS NOTES
Blood pressure 110/67, pulse 85, height 5' 6\" (1.676 m), weight 153 lb 2 oz (69.5 kg). Patient presents today following up for cellulitis of the right lower extremity. History of heart failure and kidney failure. Denies fever chills or vomiting.   No

## 2018-07-18 ENCOUNTER — OFFICE VISIT (OUTPATIENT)
Dept: CARDIOLOGY CLINIC | Facility: HOSPITAL | Age: 73
End: 2018-07-18
Attending: NURSE PRACTITIONER
Payer: MEDICARE

## 2018-07-18 VITALS
BODY MASS INDEX: 25 KG/M2 | SYSTOLIC BLOOD PRESSURE: 90 MMHG | WEIGHT: 154 LBS | OXYGEN SATURATION: 99 % | DIASTOLIC BLOOD PRESSURE: 55 MMHG | HEART RATE: 82 BPM

## 2018-07-18 DIAGNOSIS — I48.0 PAROXYSMAL A-FIB (HCC): ICD-10-CM

## 2018-07-18 DIAGNOSIS — I50.23 ACUTE ON CHRONIC SYSTOLIC CONGESTIVE HEART FAILURE (HCC): Primary | ICD-10-CM

## 2018-07-18 DIAGNOSIS — I25.5 ISCHEMIC CARDIOMYOPATHY: ICD-10-CM

## 2018-07-18 DIAGNOSIS — I50.9 HEART FAILURE, UNSPECIFIED (HCC): ICD-10-CM

## 2018-07-18 DIAGNOSIS — N18.30 CKD (CHRONIC KIDNEY DISEASE) STAGE 3, GFR 30-59 ML/MIN (HCC): ICD-10-CM

## 2018-07-18 DIAGNOSIS — N17.9 AKI (ACUTE KIDNEY INJURY) (HCC): ICD-10-CM

## 2018-07-18 LAB
ANION GAP SERPL CALC-SCNC: 12 MMOL/L (ref 0–18)
BUN SERPL-MCNC: 94 MG/DL (ref 8–20)
BUN/CREAT SERPL: 34.3 (ref 10–20)
CALCIUM SERPL-MCNC: 9.1 MG/DL (ref 8.5–10.5)
CHLORIDE SERPL-SCNC: 96 MMOL/L (ref 95–110)
CO2 SERPL-SCNC: 28 MMOL/L (ref 22–32)
CREAT SERPL-MCNC: 2.74 MG/DL (ref 0.5–1.5)
GLUCOSE SERPL-MCNC: 139 MG/DL (ref 70–99)
OSMOLALITY UR CALC.SUM OF ELEC: 313 MOSM/KG (ref 275–295)
POTASSIUM SERPL-SCNC: 4.8 MMOL/L (ref 3.3–5.1)
SODIUM SERPL-SCNC: 136 MMOL/L (ref 136–144)

## 2018-07-18 PROCEDURE — 36415 COLL VENOUS BLD VENIPUNCTURE: CPT | Performed by: NURSE PRACTITIONER

## 2018-07-18 PROCEDURE — 96374 THER/PROPH/DIAG INJ IV PUSH: CPT | Performed by: NURSE PRACTITIONER

## 2018-07-18 PROCEDURE — 99214 OFFICE O/P EST MOD 30 MIN: CPT | Performed by: NURSE PRACTITIONER

## 2018-07-18 PROCEDURE — 99212 OFFICE O/P EST SF 10 MIN: CPT | Performed by: NURSE PRACTITIONER

## 2018-07-18 PROCEDURE — 80048 BASIC METABOLIC PNL TOTAL CA: CPT | Performed by: NURSE PRACTITIONER

## 2018-07-18 RX ORDER — BUMETANIDE 0.25 MG/ML
INJECTION, SOLUTION INTRAMUSCULAR; INTRAVENOUS
Status: COMPLETED
Start: 2018-07-18 | End: 2018-07-18

## 2018-07-18 RX ADMIN — BUMETANIDE 0.5 MG: 0.25 INJECTION, SOLUTION INTRAMUSCULAR; INTRAVENOUS at 13:50:00

## 2018-07-18 NOTE — PROGRESS NOTES
[address] Patient Status:  No patient class for patient encounter    1945 MRN [id number]   Location [address] [place] [address]  MD Lori Morenorichard Gomez is a 74 year ol nausea and vomiting  Hematologic/lymphatic: negative  Musculoskeletal: negative for muscle weakness and myalgias    Objective:    Lab Results  Component Value Date/Time   WBC 4.7 07/06/2018 02:41 PM   HGB 13.7 07/06/2018 02:41 PM   HCT 42.4 07/06/2018 02:4 erythema and excoriation with dried scabs, with bandaids x2 DI , right mid tibial and lateral tibial warmth  Pulses: 2+ and symmetric  Neurologic: Grossly normal    Cardiographics:  EC18 ventricular paced, with pvc's  Echocardiogram: 18 15-20% cellulitis. He responded well to IV Bumex dose, renal function likely worsened with keflex and extra metolazone. IV bumex 0.5 mg given X1 today. Will stop metolazone and continue torsemide 40 mg bid, entresto 24-26 mg bid.  Will watch  renal function closel Continue all your same medications    Call if having any dizziness, lightheadedness, heart racing, palpitations, chest pain, shortness of breath, coughing, swelling, weight gain, fever, chills or worsening symptoms.      Weigh yourself daily in the morn Pen-injector, 1.8 mg sq daily, Disp: 15 pen, Rfl: 2  •  Insulin Pen Needle (PEN NEEDLES) 31G X 6 MM Does not apply Misc, To be used daily, Disp: 100 each, Rfl: 3  •  carvedilol (COREG) 25 MG Oral Tab, Take 25 mg by mouth 2 (two) times daily.   , Disp: , Rfl

## 2018-07-18 NOTE — PATIENT INSTRUCTIONS
Stop metolazone, continue torsemide 40 mg twice daily     Continue all your same medications    Call if having any dizziness, lightheadedness, heart racing, palpitations, chest pain, shortness of breath, coughing, swelling, weight gain, fever, chills or wo

## 2018-07-20 ENCOUNTER — OFFICE VISIT (OUTPATIENT)
Dept: FAMILY MEDICINE CLINIC | Facility: CLINIC | Age: 73
End: 2018-07-20
Payer: MEDICARE

## 2018-07-20 VITALS
HEART RATE: 112 BPM | DIASTOLIC BLOOD PRESSURE: 59 MMHG | BODY MASS INDEX: 24.81 KG/M2 | WEIGHT: 154.38 LBS | SYSTOLIC BLOOD PRESSURE: 90 MMHG | HEIGHT: 66 IN

## 2018-07-20 DIAGNOSIS — L02.415 CELLULITIS AND ABSCESS OF RIGHT LEG: ICD-10-CM

## 2018-07-20 DIAGNOSIS — S81.819A: Primary | ICD-10-CM

## 2018-07-20 DIAGNOSIS — L03.115 CELLULITIS AND ABSCESS OF RIGHT LEG: ICD-10-CM

## 2018-07-20 DIAGNOSIS — R60.0 EDEMA OF BOTH FEET: ICD-10-CM

## 2018-07-20 PROCEDURE — G0463 HOSPITAL OUTPT CLINIC VISIT: HCPCS | Performed by: FAMILY MEDICINE

## 2018-07-20 PROCEDURE — 99213 OFFICE O/P EST LOW 20 MIN: CPT | Performed by: FAMILY MEDICINE

## 2018-07-20 NOTE — PROGRESS NOTES
Blood pressure 90/59, pulse 112, height 5' 6\" (1.676 m), weight 154 lb 6 oz (70 kg). Patient presents today following up for cellulitis and skin ulcer. Denies pain. No fever chills or vomiting.     Objective skin ulcers appear to be healing well and

## 2018-07-21 ENCOUNTER — HOSPITAL (OUTPATIENT)
Dept: OTHER | Age: 73
End: 2018-07-21
Attending: INTERNAL MEDICINE

## 2018-07-21 LAB — INR BLDC: 1.8

## 2018-07-24 ENCOUNTER — OFFICE VISIT (OUTPATIENT)
Dept: CARDIOLOGY CLINIC | Facility: HOSPITAL | Age: 73
End: 2018-07-24
Attending: CLINICAL NURSE SPECIALIST
Payer: MEDICARE

## 2018-07-24 VITALS
DIASTOLIC BLOOD PRESSURE: 61 MMHG | HEART RATE: 110 BPM | OXYGEN SATURATION: 99 % | BODY MASS INDEX: 24 KG/M2 | SYSTOLIC BLOOD PRESSURE: 93 MMHG | WEIGHT: 149 LBS

## 2018-07-24 DIAGNOSIS — I50.23 ACUTE ON CHRONIC SYSTOLIC CONGESTIVE HEART FAILURE (HCC): Primary | ICD-10-CM

## 2018-07-24 DIAGNOSIS — I25.5 ISCHEMIC CARDIOMYOPATHY: ICD-10-CM

## 2018-07-24 DIAGNOSIS — I48.92 ATRIAL FLUTTER, UNSPECIFIED TYPE (HCC): ICD-10-CM

## 2018-07-24 DIAGNOSIS — N18.30 CKD (CHRONIC KIDNEY DISEASE) STAGE 3, GFR 30-59 ML/MIN (HCC): ICD-10-CM

## 2018-07-24 DIAGNOSIS — I50.9 HEART FAILURE, UNSPECIFIED (HCC): ICD-10-CM

## 2018-07-24 DIAGNOSIS — I48.0 PAROXYSMAL A-FIB (HCC): ICD-10-CM

## 2018-07-24 LAB
ANION GAP SERPL CALC-SCNC: 12 MMOL/L (ref 0–18)
BUN SERPL-MCNC: 94 MG/DL (ref 8–20)
BUN/CREAT SERPL: 44.3 (ref 10–20)
CALCIUM SERPL-MCNC: 8.9 MG/DL (ref 8.5–10.5)
CHLORIDE SERPL-SCNC: 99 MMOL/L (ref 95–110)
CO2 SERPL-SCNC: 30 MMOL/L (ref 22–32)
CREAT SERPL-MCNC: 2.12 MG/DL (ref 0.5–1.5)
GLUCOSE SERPL-MCNC: 178 MG/DL (ref 70–99)
OSMOLALITY UR CALC.SUM OF ELEC: 325 MOSM/KG (ref 275–295)
POTASSIUM SERPL-SCNC: 3.6 MMOL/L (ref 3.3–5.1)
SODIUM SERPL-SCNC: 141 MMOL/L (ref 136–144)

## 2018-07-24 PROCEDURE — 80048 BASIC METABOLIC PNL TOTAL CA: CPT | Performed by: NURSE PRACTITIONER

## 2018-07-24 PROCEDURE — 99214 OFFICE O/P EST MOD 30 MIN: CPT | Performed by: NURSE PRACTITIONER

## 2018-07-24 PROCEDURE — 99212 OFFICE O/P EST SF 10 MIN: CPT | Performed by: NURSE PRACTITIONER

## 2018-07-24 PROCEDURE — 36415 COLL VENOUS BLD VENIPUNCTURE: CPT | Performed by: NURSE PRACTITIONER

## 2018-07-24 PROCEDURE — 93005 ELECTROCARDIOGRAM TRACING: CPT

## 2018-07-24 PROCEDURE — 93010 ELECTROCARDIOGRAM REPORT: CPT | Performed by: NURSE PRACTITIONER

## 2018-07-24 RX ORDER — POTASSIUM CHLORIDE 20 MEQ/1
TABLET, EXTENDED RELEASE ORAL
Status: DISCONTINUED
Start: 2018-07-24 | End: 2018-07-24

## 2018-07-24 NOTE — PATIENT INSTRUCTIONS
Take a metolazone 2.5 mg tablet tomorrow morning 30 minutes before your torsemide dose    Take potassium chloride 20 meq tab in the morning and afternoon tomorrow only     Continue all your same medications    Call if having any dizziness, lightheadedness,

## 2018-07-24 NOTE — PROGRESS NOTES
[address] Patient Status:  No patient class for patient encounter    1945 MRN [id number]   Location [address] [place] [address]  MD Gabby Sood Pablo is a 74 year ol 07/06/2018 02:41 PM   HGB 13.7 07/06/2018 02:41 PM   HCT 42.4 07/06/2018 02:41 PM    (L) 07/06/2018 02:41 PM   CREATSERUM 2.74 (H) 07/18/2018 01:03 PM   BUN 94 (H) 07/18/2018 01:03 PM    07/18/2018 01:03 PM   K 4.8 07/18/2018 01:03 PM   CL 96 vpacing 114 bpm, qtc 504 ms   EC18 ventricular paced, with pvc's    Echo:pending 18 at [facility] [place]  Echocardiogram: 18 15-20%,  grade 3 diastolic dysfunction,  Severe L atrial enlargement, mitral clip with mod MR, severe TR,  PA 45 mmhg    Device to baseline, renal function has improved, bun 94, cr 2.12, K 3.6. RLE cellulitis improving on keflex. Improved yaron LE swelling R/L, but still swollen.  Kdur 20 meq po x1 given today per apn, take metolazone 2.5 mg X1 tomorrow and Kdur 20 meq po tomorrow X 2 atrial fibrillation  -remains RRR on sotalol, anticoagulated on warfarin    Mitral valve insufficiency s/p mitral valve clip- moderate MR on recent echo Jan 2018   -repeat echo done yesterday, results pending    RLE Cellulitis improving  -Completing Keflex Disp: , Rfl: 3  •  insulin glargine (LANTUS SOLOSTAR) 100 UNIT/ML Subcutaneous Solution Pen-injector, 12 u sq daily, Disp: 5 pen, Rfl: 11  •  Insulin Pen Needle (CARETOUCH PEN NEEDLES) 32G X 5 MM Does not apply Misc, To be used daily, Disp: 100 each, Rfl:

## 2018-07-27 ENCOUNTER — PRIOR ORIGINAL RECORDS (OUTPATIENT)
Dept: OTHER | Age: 73
End: 2018-07-27

## 2018-07-27 ENCOUNTER — TELEPHONE (OUTPATIENT)
Dept: CARDIOLOGY CLINIC | Facility: HOSPITAL | Age: 73
End: 2018-07-27

## 2018-07-27 NOTE — TELEPHONE ENCOUNTER
Call from [address] [place] [address] asked if Irwin Pacheco obtained Defib reading for pt . Missael Boyce will call pt and ask him to transmit readings.

## 2018-07-31 ENCOUNTER — OFFICE VISIT (OUTPATIENT)
Dept: FAMILY MEDICINE CLINIC | Facility: CLINIC | Age: 73
End: 2018-07-31
Payer: MEDICARE

## 2018-07-31 VITALS
WEIGHT: 145 LBS | HEART RATE: 65 BPM | SYSTOLIC BLOOD PRESSURE: 96 MMHG | BODY MASS INDEX: 23.3 KG/M2 | DIASTOLIC BLOOD PRESSURE: 57 MMHG | HEIGHT: 66 IN | TEMPERATURE: 97 F

## 2018-07-31 DIAGNOSIS — R60.0 EDEMA OF BOTH FEET: ICD-10-CM

## 2018-07-31 DIAGNOSIS — S81.819A: Primary | ICD-10-CM

## 2018-07-31 PROCEDURE — G0463 HOSPITAL OUTPT CLINIC VISIT: HCPCS | Performed by: FAMILY MEDICINE

## 2018-07-31 PROCEDURE — 99213 OFFICE O/P EST LOW 20 MIN: CPT | Performed by: FAMILY MEDICINE

## 2018-07-31 RX ORDER — ALLOPURINOL 100 MG/1
TABLET ORAL
Refills: 2 | COMMUNITY
Start: 2018-06-25

## 2018-07-31 NOTE — PROGRESS NOTES
Blood pressure 96/57, pulse 65, temperature 97.4 °F (36.3 °C), temperature source Oral, height 5' 6\" (1.676 m), weight 145 lb (65.8 kg). Following up for right lower extremity wound. Reports he feels well.   Denies any purulent discharge no fever no pa

## 2018-08-01 ENCOUNTER — HOSPITAL (OUTPATIENT)
Dept: OTHER | Age: 73
End: 2018-08-01
Attending: INTERNAL MEDICINE

## 2018-08-04 LAB
INR PPP: 1.5
PROTHROMBIN TIME: 14.7 SECONDS (ref 9.7–11.8)
PROTHROMBIN TIME: ABNORMAL

## 2018-08-06 ENCOUNTER — OFFICE VISIT (OUTPATIENT)
Dept: CARDIOLOGY CLINIC | Facility: HOSPITAL | Age: 73
End: 2018-08-06
Attending: INTERNAL MEDICINE
Payer: MEDICARE

## 2018-08-06 VITALS
SYSTOLIC BLOOD PRESSURE: 102 MMHG | BODY MASS INDEX: 24 KG/M2 | WEIGHT: 149 LBS | HEART RATE: 83 BPM | DIASTOLIC BLOOD PRESSURE: 67 MMHG | OXYGEN SATURATION: 98 %

## 2018-08-06 DIAGNOSIS — N18.30 CKD (CHRONIC KIDNEY DISEASE) STAGE 3, GFR 30-59 ML/MIN (HCC): ICD-10-CM

## 2018-08-06 DIAGNOSIS — I50.43 ACUTE ON CHRONIC COMBINED SYSTOLIC AND DIASTOLIC CHF, NYHA CLASS 2 (HCC): Chronic | ICD-10-CM

## 2018-08-06 DIAGNOSIS — I50.9 HEART FAILURE, UNSPECIFIED (HCC): ICD-10-CM

## 2018-08-06 DIAGNOSIS — I50.23 ACUTE ON CHRONIC SYSTOLIC CONGESTIVE HEART FAILURE (HCC): ICD-10-CM

## 2018-08-06 DIAGNOSIS — I25.5 ISCHEMIC CARDIOMYOPATHY: ICD-10-CM

## 2018-08-06 DIAGNOSIS — I34.0 MITRAL VALVE INSUFFICIENCY, UNSPECIFIED ETIOLOGY: ICD-10-CM

## 2018-08-06 DIAGNOSIS — I48.0 PAROXYSMAL A-FIB (HCC): Primary | ICD-10-CM

## 2018-08-06 LAB
ANION GAP SERPL CALC-SCNC: 10 MMOL/L (ref 0–18)
BUN SERPL-MCNC: 70 MG/DL (ref 8–20)
BUN/CREAT SERPL: 33.5 (ref 10–20)
CALCIUM SERPL-MCNC: 8.9 MG/DL (ref 8.5–10.5)
CHLORIDE SERPL-SCNC: 100 MMOL/L (ref 95–110)
CO2 SERPL-SCNC: 28 MMOL/L (ref 22–32)
CREAT SERPL-MCNC: 2.09 MG/DL (ref 0.5–1.5)
GLUCOSE SERPL-MCNC: 128 MG/DL (ref 70–99)
OSMOLALITY UR CALC.SUM OF ELEC: 308 MOSM/KG (ref 275–295)
POTASSIUM SERPL-SCNC: 4.1 MMOL/L (ref 3.3–5.1)
SODIUM SERPL-SCNC: 138 MMOL/L (ref 136–144)

## 2018-08-06 PROCEDURE — 80048 BASIC METABOLIC PNL TOTAL CA: CPT | Performed by: NURSE PRACTITIONER

## 2018-08-06 PROCEDURE — 99212 OFFICE O/P EST SF 10 MIN: CPT | Performed by: NURSE PRACTITIONER

## 2018-08-06 PROCEDURE — 99214 OFFICE O/P EST MOD 30 MIN: CPT | Performed by: NURSE PRACTITIONER

## 2018-08-06 PROCEDURE — 36415 COLL VENOUS BLD VENIPUNCTURE: CPT | Performed by: NURSE PRACTITIONER

## 2018-08-06 RX ORDER — SOTALOL HYDROCHLORIDE 120 MG/1
120 TABLET ORAL 2 TIMES DAILY
Qty: 60 TABLET | Refills: 1 | Status: SHIPPED | OUTPATIENT
Start: 2018-08-06 | End: 2018-08-20

## 2018-08-06 NOTE — PATIENT INSTRUCTIONS
Take warfarin 5 mg tab today and again tomorrow then back to 2.5 mg on Wednesday, Thursday, Friday and Saturday, and warfarin 5 mg on Sunday ( 8-12-18)     Have INR on Monday 8-14-18.      Continue all your same medications except increase sotalol to 120 mg

## 2018-08-06 NOTE — PROGRESS NOTES
[address] Patient Status:  No patient class for patient encounter    1945 MRN [id number]   Location [address] [place] [address]  MD Tree Nielson is a 74 year ol vomiting  Hematologic/lymphatic: negative  Musculoskeletal: negative for muscle weakness and myalgias    Objective:    Lab Results  Component Value Date/Time   WBC 4.7 07/06/2018 02:41 PM   HGB 13.7 07/06/2018 02:41 PM   HCT 42.4 07/06/2018 02:41 PM   PLT symmetric  Neurologic: Grossly normal    Cardiographics:  EC18 atrial rhythm with vpacing 114 bpm, qtc 504 ms   EC18 ventricular paced, with pvc's    Echo:pending 18 [facility]  Echocardiogram: 18 15-20%,  grade 3 diastolic dysfuncti hypertension    Mitral valve insufficiency s/p mitral valve clip- moderate MR on recent echo Jan 2018   -repeat echo noted decreased EF to 20% from 25-30% in 2016 and mild to moderate mitral regurg     RLE Cellulitis- resolved    Decision Making:   Here fo gain, increased fatigue, sob, chest pressure, dizziness, swelling or bloating. Reinforced 2 gm sodium restricted diet and fluid restriction at 48-64 oz/day.  He is considering cardiomems as option for fluid management, discussed procedure and risks, benefi GLUCOSE TEST STRIPS) In Vitro Strip, Test blood sugar twice daily, fasting, Disp: 200 strip, Rfl: 0  •  LANCETS ULTRA THIN 30G Does not apply Misc, Test blood sugar twice daily, fasting, Disp: 200 each, Rfl: 0  •  FREESTYLE SYSTEM Does not apply Kit, Test

## 2018-08-07 PROBLEM — I50.43 ACUTE ON CHRONIC COMBINED SYSTOLIC AND DIASTOLIC CHF, NYHA CLASS 2 (HCC): Chronic | Status: ACTIVE | Noted: 2018-08-07

## 2018-08-07 PROBLEM — I50.23 ACUTE ON CHRONIC SYSTOLIC CONGESTIVE HEART FAILURE (HCC): Chronic | Status: RESOLVED | Noted: 2018-04-10 | Resolved: 2018-08-07

## 2018-08-14 ENCOUNTER — PRIOR ORIGINAL RECORDS (OUTPATIENT)
Dept: OTHER | Age: 73
End: 2018-08-14

## 2018-08-15 ENCOUNTER — PRIOR ORIGINAL RECORDS (OUTPATIENT)
Dept: OTHER | Age: 73
End: 2018-08-15

## 2018-08-18 ENCOUNTER — LAB ENCOUNTER (OUTPATIENT)
Dept: LAB | Age: 73
End: 2018-08-18
Attending: INTERNAL MEDICINE
Payer: MEDICARE

## 2018-08-18 DIAGNOSIS — I48.91 ATRIAL FIBRILLATION (HCC): Primary | ICD-10-CM

## 2018-08-18 DIAGNOSIS — Z79.01 LONG TERM (CURRENT) USE OF ANTICOAGULANTS: ICD-10-CM

## 2018-08-18 LAB
INR BLD: 1.9 (ref 0.9–1.2)
PROTHROMBIN TIME: 21 SECONDS (ref 11.8–14.5)

## 2018-08-18 PROCEDURE — 36415 COLL VENOUS BLD VENIPUNCTURE: CPT

## 2018-08-18 PROCEDURE — 85610 PROTHROMBIN TIME: CPT

## 2018-08-20 ENCOUNTER — OFFICE VISIT (OUTPATIENT)
Dept: CARDIOLOGY CLINIC | Facility: HOSPITAL | Age: 73
End: 2018-08-20
Attending: INTERNAL MEDICINE
Payer: MEDICARE

## 2018-08-20 ENCOUNTER — TELEPHONE (OUTPATIENT)
Dept: FAMILY MEDICINE CLINIC | Facility: CLINIC | Age: 73
End: 2018-08-20

## 2018-08-20 VITALS
WEIGHT: 158 LBS | OXYGEN SATURATION: 94 % | HEART RATE: 80 BPM | SYSTOLIC BLOOD PRESSURE: 97 MMHG | DIASTOLIC BLOOD PRESSURE: 62 MMHG | BODY MASS INDEX: 26 KG/M2

## 2018-08-20 DIAGNOSIS — I34.0 MITRAL VALVE INSUFFICIENCY, UNSPECIFIED ETIOLOGY: ICD-10-CM

## 2018-08-20 DIAGNOSIS — I48.0 PAROXYSMAL A-FIB (HCC): ICD-10-CM

## 2018-08-20 DIAGNOSIS — I50.9 HEART FAILURE, UNSPECIFIED (HCC): ICD-10-CM

## 2018-08-20 DIAGNOSIS — N18.30 CKD (CHRONIC KIDNEY DISEASE) STAGE 3, GFR 30-59 ML/MIN (HCC): ICD-10-CM

## 2018-08-20 DIAGNOSIS — I25.5 ISCHEMIC CARDIOMYOPATHY: ICD-10-CM

## 2018-08-20 DIAGNOSIS — I50.43 ACUTE ON CHRONIC COMBINED SYSTOLIC AND DIASTOLIC CHF, NYHA CLASS 2 (HCC): Primary | ICD-10-CM

## 2018-08-20 LAB
ANION GAP SERPL CALC-SCNC: 12 MMOL/L (ref 0–18)
BUN SERPL-MCNC: 106 MG/DL (ref 8–20)
BUN/CREAT SERPL: 37.6 (ref 10–20)
CALCIUM SERPL-MCNC: 8.6 MG/DL (ref 8.5–10.5)
CHLORIDE SERPL-SCNC: 100 MMOL/L (ref 95–110)
CO2 SERPL-SCNC: 24 MMOL/L (ref 22–32)
CREAT SERPL-MCNC: 2.82 MG/DL (ref 0.5–1.5)
GLUCOSE SERPL-MCNC: 168 MG/DL (ref 70–99)
OSMOLALITY UR CALC.SUM OF ELEC: 319 MOSM/KG (ref 275–295)
POTASSIUM SERPL-SCNC: 3.9 MMOL/L (ref 3.3–5.1)
SODIUM SERPL-SCNC: 136 MMOL/L (ref 136–144)

## 2018-08-20 PROCEDURE — 99212 OFFICE O/P EST SF 10 MIN: CPT | Performed by: NURSE PRACTITIONER

## 2018-08-20 PROCEDURE — 36415 COLL VENOUS BLD VENIPUNCTURE: CPT | Performed by: NURSE PRACTITIONER

## 2018-08-20 PROCEDURE — 99214 OFFICE O/P EST MOD 30 MIN: CPT | Performed by: NURSE PRACTITIONER

## 2018-08-20 PROCEDURE — 96374 THER/PROPH/DIAG INJ IV PUSH: CPT | Performed by: NURSE PRACTITIONER

## 2018-08-20 PROCEDURE — 80048 BASIC METABOLIC PNL TOTAL CA: CPT | Performed by: NURSE PRACTITIONER

## 2018-08-20 RX ORDER — SOTALOL HYDROCHLORIDE 80 MG/1
80 TABLET ORAL 2 TIMES DAILY
COMMUNITY

## 2018-08-20 RX ORDER — POTASSIUM CHLORIDE 20 MEQ/1
TABLET, EXTENDED RELEASE ORAL
Status: COMPLETED
Start: 2018-08-20 | End: 2018-08-20

## 2018-08-20 RX ORDER — SOTALOL HYDROCHLORIDE 120 MG/1
80 TABLET ORAL 2 TIMES DAILY
COMMUNITY
Start: 2018-08-20 | End: 2018-08-20 | Stop reason: DRUGHIGH

## 2018-08-20 RX ORDER — BUMETANIDE 0.25 MG/ML
INJECTION, SOLUTION INTRAMUSCULAR; INTRAVENOUS
Status: COMPLETED
Start: 2018-08-20 | End: 2018-08-20

## 2018-08-20 RX ADMIN — BUMETANIDE 1 MG: 0.25 INJECTION, SOLUTION INTRAMUSCULAR; INTRAVENOUS at 11:50:00

## 2018-08-20 RX ADMIN — POTASSIUM CHLORIDE 20 MEQ: 20 TABLET, EXTENDED RELEASE ORAL at 12:00:00

## 2018-08-20 NOTE — PROGRESS NOTES
[address] Patient Status:  No patient class for patient encounter    1945 MRN [id number]   Location [address] [place] [address]  MD Charlotte Gentile Chrystal is a 74 year ol negative  Musculoskeletal: negative for muscle weakness and myalgias    Objective:    Lab Results  Component Value Date/Time   WBC 4.7 07/06/2018 02:41 PM   HGB 13.7 07/06/2018 02:41 PM   HCT 42.4 07/06/2018 02:41 PM    (L) 07/06/2018 02:41 PM   CRE from below the knee to pedal, soft  Pulses: 2+ and symmetric  Neurologic: Grossly normal    Cardiographics:  EC18 AV pacing, qtc 600 ms  EC18 atrial rhythm with vpacing 114 bpm, qtc 504 ms   EC18 ventricular paced, with pvc's    Ech longest episode was 6 days, up to 12 % of afib  -Sotalol was increased to 120 mg bid with increased qtc, decreased back to sotalol 80 mg bid and continued coreg, and  anticoagulated on warfarin  -heart rate regular/irregular 78 bpm  -plan for AVN ablation and 40 mg in pm. Plan for AVN ablation with Dr. Jesus Ambrosio. Continue entresto 24-26 mg bid, coreg 25 mg bid, BIdil 20-37.5 mg bid and warfarin. Continue daily weights and call with wt gain, increased fatigue, sob, chest pressure, dizziness, swelling or bloating. each, Rfl: 0  •  FREESTYLE SYSTEM Does not apply Kit, Test blood sugar twice daily, fasting, Disp: 1 each, Rfl: 0  •  atorvastatin 40 MG Oral Tab, Take 40 mg by mouth nightly., Disp: , Rfl: 3  •  insulin glargine (LANTUS SOLOSTAR) 100 UNIT/ML Subcutaneous

## 2018-08-20 NOTE — TELEPHONE ENCOUNTER
----- Message from Tommie Herrera DO sent at 8/18/2018  7:32 AM CDT -----  Kidney function remains low. Follow-up with Dr. Igor Welch in September, as she recommended.

## 2018-08-20 NOTE — PATIENT INSTRUCTIONS
Increase torsemide 40 mg tabs take 2 tabs in the morning and one tab in the afternoon for 3 days then resume 1 tablet twice daily     Continue all your same medications    Call if having any dizziness, lightheadedness, heart racing, palpitations, chest colton

## 2018-08-29 ENCOUNTER — OFFICE VISIT (OUTPATIENT)
Dept: CARDIOLOGY CLINIC | Facility: HOSPITAL | Age: 73
End: 2018-08-29
Attending: NURSE PRACTITIONER
Payer: MEDICARE

## 2018-08-29 VITALS
DIASTOLIC BLOOD PRESSURE: 74 MMHG | HEART RATE: 64 BPM | SYSTOLIC BLOOD PRESSURE: 107 MMHG | WEIGHT: 159.88 LBS | OXYGEN SATURATION: 92 % | BODY MASS INDEX: 26 KG/M2

## 2018-08-29 DIAGNOSIS — I25.5 ISCHEMIC CARDIOMYOPATHY: ICD-10-CM

## 2018-08-29 DIAGNOSIS — N18.30 CKD (CHRONIC KIDNEY DISEASE) STAGE 3, GFR 30-59 ML/MIN (HCC): ICD-10-CM

## 2018-08-29 DIAGNOSIS — I34.0 MITRAL VALVE INSUFFICIENCY, UNSPECIFIED ETIOLOGY: ICD-10-CM

## 2018-08-29 DIAGNOSIS — I50.9 HEART FAILURE, UNSPECIFIED (HCC): ICD-10-CM

## 2018-08-29 DIAGNOSIS — I50.43 ACUTE ON CHRONIC COMBINED SYSTOLIC AND DIASTOLIC CHF, NYHA CLASS 2 (HCC): Primary | ICD-10-CM

## 2018-08-29 DIAGNOSIS — I48.0 PAROXYSMAL A-FIB (HCC): ICD-10-CM

## 2018-08-29 LAB
ANION GAP SERPL CALC-SCNC: 13 MMOL/L (ref 0–18)
BUN SERPL-MCNC: 112 MG/DL (ref 8–20)
BUN/CREAT SERPL: 39.2 (ref 10–20)
CALCIUM SERPL-MCNC: 9.2 MG/DL (ref 8.5–10.5)
CHLORIDE SERPL-SCNC: 101 MMOL/L (ref 95–110)
CO2 SERPL-SCNC: 22 MMOL/L (ref 22–32)
CREAT SERPL-MCNC: 2.86 MG/DL (ref 0.5–1.5)
GLUCOSE SERPL-MCNC: 153 MG/DL (ref 70–99)
OSMOLALITY UR CALC.SUM OF ELEC: 321 MOSM/KG (ref 275–295)
POTASSIUM SERPL-SCNC: 4.5 MMOL/L (ref 3.3–5.1)
SODIUM SERPL-SCNC: 136 MMOL/L (ref 136–144)

## 2018-08-29 PROCEDURE — 36415 COLL VENOUS BLD VENIPUNCTURE: CPT | Performed by: NURSE PRACTITIONER

## 2018-08-29 PROCEDURE — 99214 OFFICE O/P EST MOD 30 MIN: CPT | Performed by: NURSE PRACTITIONER

## 2018-08-29 PROCEDURE — 80048 BASIC METABOLIC PNL TOTAL CA: CPT | Performed by: NURSE PRACTITIONER

## 2018-08-29 PROCEDURE — 99212 OFFICE O/P EST SF 10 MIN: CPT | Performed by: NURSE PRACTITIONER

## 2018-08-29 PROCEDURE — 96374 THER/PROPH/DIAG INJ IV PUSH: CPT | Performed by: NURSE PRACTITIONER

## 2018-08-29 RX ORDER — METOLAZONE 5 MG/1
TABLET ORAL
Status: DISCONTINUED
Start: 2018-08-29 | End: 2018-08-29 | Stop reason: WASHOUT

## 2018-08-29 RX ORDER — POTASSIUM CHLORIDE 20 MEQ/1
TABLET, EXTENDED RELEASE ORAL
Status: COMPLETED
Start: 2018-08-29 | End: 2018-08-29

## 2018-08-29 RX ORDER — BUMETANIDE 0.25 MG/ML
INJECTION, SOLUTION INTRAMUSCULAR; INTRAVENOUS
Status: COMPLETED
Start: 2018-08-29 | End: 2018-08-29

## 2018-08-29 RX ADMIN — POTASSIUM CHLORIDE 20 MEQ: 20 TABLET, EXTENDED RELEASE ORAL at 16:25:00

## 2018-08-29 RX ADMIN — BUMETANIDE 1 MG: 0.25 INJECTION, SOLUTION INTRAMUSCULAR; INTRAVENOUS at 16:29:00

## 2018-08-29 NOTE — PROGRESS NOTES
[address] Patient Status:  No patient class for patient encounter    1945 MRN [id number]   Location [address] [place] [address]  MD Emanuel Lord is a 74 year ol negative for abdominal pain, melena, nausea and vomiting  Hematologic/lymphatic: negative  Musculoskeletal: negative for muscle weakness and myalgias    Objective:    Lab Results  Component Value Date/Time   WBC 4.7 07/06/2018 02:41 PM   HGB 13.7 07/06/201 masses,  no organomegaly  Extremities: extremities normal, atraumatic, no cyanosis.   +2 RLE, +1-2 L LE swelling from below the knee to pedal, firm bilaterally   Pulses: 2+ and symmetric  Neurologic: Grossly normal    Cardiographics:  EC-18 AV pacing still worsened, cr 2.86  -continue entresto 24.26 mg bid  - on torsemide 40 mg bid, coreg, bidil, , no spironolactone with CKD  -pt considering  cardiomems.     Paroxysmal atrial fibrillation  -recurrent episodes of afib, longest episode was 6 days, up to 1 recent echo with mild- moderate mitral regurg, EF down from 25-30%. 1 mg IV bumex and kdur 20 meq po x1 given, tolerated well, VSS post injection. increase torsemide to 80 mg in am and 40 mg in pm X 5 days. Plan for AVN ablation with Dr. Zahra Goodrich on 9-14-18.  C 3 (three) times daily. , Disp: , Rfl: 3  •  Glucose Blood (COOL BLOOD GLUCOSE TEST STRIPS) In Vitro Strip, Test blood sugar twice daily, fasting, Disp: 200 strip, Rfl: 0  •  LANCETS ULTRA THIN 30G Does not apply Misc, Test blood sugar twice daily, fasting,

## 2018-08-29 NOTE — PATIENT INSTRUCTIONS
Increase torsemide 20 mg - take 3 tabs in the morning and 2 tablets in the afternoon for 5 days then resume 2 tablets twice daily     Continue all your same medications    Call if having any dizziness, lightheadedness, heart racing, palpitations, chest colton

## 2018-09-04 ENCOUNTER — OFFICE VISIT (OUTPATIENT)
Dept: CARDIOLOGY CLINIC | Facility: HOSPITAL | Age: 73
End: 2018-09-04
Attending: NURSE PRACTITIONER
Payer: MEDICARE

## 2018-09-04 VITALS
SYSTOLIC BLOOD PRESSURE: 108 MMHG | WEIGHT: 149.19 LBS | BODY MASS INDEX: 24 KG/M2 | DIASTOLIC BLOOD PRESSURE: 70 MMHG | OXYGEN SATURATION: 99 % | HEART RATE: 71 BPM

## 2018-09-04 DIAGNOSIS — N18.30 CKD (CHRONIC KIDNEY DISEASE) STAGE 3, GFR 30-59 ML/MIN (HCC): ICD-10-CM

## 2018-09-04 DIAGNOSIS — I48.0 PAROXYSMAL A-FIB (HCC): ICD-10-CM

## 2018-09-04 DIAGNOSIS — I50.9 HEART FAILURE, UNSPECIFIED (HCC): ICD-10-CM

## 2018-09-04 DIAGNOSIS — I50.43 ACUTE ON CHRONIC COMBINED SYSTOLIC AND DIASTOLIC CHF, NYHA CLASS 2 (HCC): Primary | ICD-10-CM

## 2018-09-04 DIAGNOSIS — N18.4 CKD (CHRONIC KIDNEY DISEASE) STAGE 4, GFR 15-29 ML/MIN (HCC): ICD-10-CM

## 2018-09-04 DIAGNOSIS — I34.0 MITRAL VALVE INSUFFICIENCY, UNSPECIFIED ETIOLOGY: ICD-10-CM

## 2018-09-04 LAB
ANION GAP SERPL CALC-SCNC: 14 MMOL/L (ref 0–18)
BUN SERPL-MCNC: 78 MG/DL (ref 8–20)
BUN/CREAT SERPL: 30.7 (ref 10–20)
CALCIUM SERPL-MCNC: 9.2 MG/DL (ref 8.5–10.5)
CHLORIDE SERPL-SCNC: 101 MMOL/L (ref 95–110)
CO2 SERPL-SCNC: 28 MMOL/L (ref 22–32)
CREAT SERPL-MCNC: 2.54 MG/DL (ref 0.5–1.5)
GLUCOSE SERPL-MCNC: 154 MG/DL (ref 70–99)
OSMOLALITY UR CALC.SUM OF ELEC: 322 MOSM/KG (ref 275–295)
POTASSIUM SERPL-SCNC: 4.8 MMOL/L (ref 3.3–5.1)
SODIUM SERPL-SCNC: 143 MMOL/L (ref 136–144)

## 2018-09-04 PROCEDURE — 99214 OFFICE O/P EST MOD 30 MIN: CPT | Performed by: NURSE PRACTITIONER

## 2018-09-04 PROCEDURE — 36415 COLL VENOUS BLD VENIPUNCTURE: CPT | Performed by: NURSE PRACTITIONER

## 2018-09-04 PROCEDURE — 80048 BASIC METABOLIC PNL TOTAL CA: CPT | Performed by: NURSE PRACTITIONER

## 2018-09-04 PROCEDURE — 99212 OFFICE O/P EST SF 10 MIN: CPT | Performed by: NURSE PRACTITIONER

## 2018-09-04 NOTE — PATIENT INSTRUCTIONS
Call Dr. Umesh Christianson office tomorrow and talk to Nicolasa Oropeza (his medical assistant ) to schedule the Ablation procedure  [phone]    When you have a date set up for your procedure you will get a call from the [facility] nurse with instructions and

## 2018-09-04 NOTE — PROGRESS NOTES
[address] Patient Status:  No patient class for patient encounter    1945 MRN [id number]   Location [address] [place] [address]  MD Gabby Sood Pablo is a 74 year ol orthopnea and palpitations  Gastrointestinal: negative for abdominal pain, melena, nausea and vomiting  Hematologic/lymphatic: negative  Musculoskeletal: negative for muscle weakness and myalgias    Objective:    Lab Results  Component Value Date/Time   WB no organomegaly  Extremities: extremities normal, atraumatic, no cyanosis.   +1 yaron LE swelling from mid tibial down ankles, softer bilaterally   Pulses: 2+ and symmetric  Neurologic: Grossly normal    Cardiographics:  EC18 AV pacing, qtc 600 ms  EC ago  -continue entresto 24.26 mg bid  - continue torsemide 40 mg bid, coreg, bidil, , no spironolactone with CKD  -pt considering  cardiomems.     Paroxysmal atrial fibrillation  -recurrent episodes of afib, longest episode was 6 days, up to 12 % of afib  - paroxysmal atrial fib, rate controlled. EF has decreased to 20% on recent echo with mild- moderate mitral regurg, EF down from 25-30%. Improved fluid overload.   Continue torsemide 40 mg bid, entresto 24-26 mg bid, coreg 25 mg bid, BIdil 20-37.5 mg bid and Subcutaneous Solution Pen-injector, 1.8 mg sq daily, Disp: 15 pen, Rfl: 2  •  Insulin Pen Needle (PEN NEEDLES) 31G X 6 MM Does not apply Misc, To be used daily, Disp: 100 each, Rfl: 3  •  carvedilol (COREG) 25 MG Oral Tab, Take 25 mg by mouth 2 (two) times

## 2018-09-10 ENCOUNTER — APPOINTMENT (OUTPATIENT)
Dept: LAB | Age: 73
End: 2018-09-10
Attending: NURSE PRACTITIONER
Payer: MEDICARE

## 2018-09-10 ENCOUNTER — PRIOR ORIGINAL RECORDS (OUTPATIENT)
Dept: OTHER | Age: 73
End: 2018-09-10

## 2018-09-10 DIAGNOSIS — I50.9 HEART FAILURE, UNSPECIFIED (HCC): ICD-10-CM

## 2018-09-10 DIAGNOSIS — N18.4 CKD (CHRONIC KIDNEY DISEASE) STAGE 4, GFR 15-29 ML/MIN (HCC): ICD-10-CM

## 2018-09-10 LAB
ANION GAP SERPL CALC-SCNC: 16 MMOL/L (ref 0–18)
BUN SERPL-MCNC: 77 MG/DL (ref 8–20)
BUN/CREAT SERPL: 29.1 (ref 10–20)
CALCIUM SERPL-MCNC: 9.5 MG/DL (ref 8.5–10.5)
CHLORIDE SERPL-SCNC: 98 MMOL/L (ref 95–110)
CO2 SERPL-SCNC: 25 MMOL/L (ref 22–32)
CREAT SERPL-MCNC: 2.65 MG/DL (ref 0.5–1.5)
GLUCOSE SERPL-MCNC: 124 MG/DL (ref 70–99)
INR: 0
OSMOLALITY UR CALC.SUM OF ELEC: 312 MOSM/KG (ref 275–295)
POTASSIUM SERPL-SCNC: 4.9 MMOL/L (ref 3.3–5.1)
SODIUM SERPL-SCNC: 139 MMOL/L (ref 136–144)

## 2018-09-10 PROCEDURE — 80048 BASIC METABOLIC PNL TOTAL CA: CPT

## 2018-09-10 PROCEDURE — 36415 COLL VENOUS BLD VENIPUNCTURE: CPT

## 2018-09-11 ENCOUNTER — LAB ENCOUNTER (OUTPATIENT)
Dept: LAB | Facility: HOSPITAL | Age: 73
End: 2018-09-11
Attending: INTERNAL MEDICINE
Payer: MEDICARE

## 2018-09-11 DIAGNOSIS — Z79.01 LONG TERM (CURRENT) USE OF ANTICOAGULANTS: Primary | ICD-10-CM

## 2018-09-11 LAB
INR BLD: 3.2 (ref 0.9–1.2)
INR: 3.2
PROTHROMBIN TIME: 31.6 SECONDS (ref 11.8–14.5)

## 2018-09-11 PROCEDURE — 85610 PROTHROMBIN TIME: CPT

## 2018-09-11 PROCEDURE — 36415 COLL VENOUS BLD VENIPUNCTURE: CPT

## 2018-09-12 ENCOUNTER — PRIOR ORIGINAL RECORDS (OUTPATIENT)
Dept: OTHER | Age: 73
End: 2018-09-12

## 2018-10-12 ENCOUNTER — HOSPITAL ENCOUNTER (OUTPATIENT)
Dept: INTERVENTIONAL RADIOLOGY/VASCULAR | Facility: HOSPITAL | Age: 73
Discharge: HOME | End: 2018-10-12
Attending: INTERNAL MEDICINE
Payer: COMMERCIAL

## 2019-02-28 VITALS
OXYGEN SATURATION: 97 % | BODY MASS INDEX: 26.06 KG/M2 | DIASTOLIC BLOOD PRESSURE: 58 MMHG | WEIGHT: 166 LBS | HEIGHT: 67 IN | SYSTOLIC BLOOD PRESSURE: 100 MMHG | HEART RATE: 71 BPM

## 2019-02-28 VITALS
DIASTOLIC BLOOD PRESSURE: 60 MMHG | WEIGHT: 142 LBS | HEART RATE: 72 BPM | BODY MASS INDEX: 22.29 KG/M2 | OXYGEN SATURATION: 98 % | SYSTOLIC BLOOD PRESSURE: 112 MMHG | HEIGHT: 67 IN

## 2019-02-28 VITALS
WEIGHT: 156 LBS | BODY MASS INDEX: 24.48 KG/M2 | OXYGEN SATURATION: 99 % | SYSTOLIC BLOOD PRESSURE: 122 MMHG | DIASTOLIC BLOOD PRESSURE: 60 MMHG | HEIGHT: 67 IN | HEART RATE: 66 BPM

## 2019-02-28 VITALS
SYSTOLIC BLOOD PRESSURE: 98 MMHG | WEIGHT: 140 LBS | RESPIRATION RATE: 16 BRPM | DIASTOLIC BLOOD PRESSURE: 50 MMHG | HEART RATE: 66 BPM

## 2019-02-28 VITALS
BODY MASS INDEX: 25.74 KG/M2 | OXYGEN SATURATION: 98 % | SYSTOLIC BLOOD PRESSURE: 110 MMHG | HEIGHT: 67 IN | DIASTOLIC BLOOD PRESSURE: 70 MMHG | HEART RATE: 79 BPM | WEIGHT: 164 LBS

## 2019-02-28 VITALS
SYSTOLIC BLOOD PRESSURE: 116 MMHG | HEIGHT: 67 IN | WEIGHT: 161 LBS | DIASTOLIC BLOOD PRESSURE: 60 MMHG | OXYGEN SATURATION: 99 % | HEART RATE: 119 BPM | BODY MASS INDEX: 25.27 KG/M2

## 2019-02-28 VITALS
DIASTOLIC BLOOD PRESSURE: 62 MMHG | OXYGEN SATURATION: 98 % | WEIGHT: 170 LBS | HEART RATE: 72 BPM | HEIGHT: 67 IN | SYSTOLIC BLOOD PRESSURE: 96 MMHG | BODY MASS INDEX: 26.68 KG/M2 | RESPIRATION RATE: 18 BRPM

## 2019-02-28 VITALS
OXYGEN SATURATION: 97 % | SYSTOLIC BLOOD PRESSURE: 110 MMHG | DIASTOLIC BLOOD PRESSURE: 60 MMHG | BODY MASS INDEX: 24.33 KG/M2 | WEIGHT: 155 LBS | HEIGHT: 67 IN | HEART RATE: 65 BPM

## 2019-02-28 VITALS
DIASTOLIC BLOOD PRESSURE: 56 MMHG | HEART RATE: 56 BPM | RESPIRATION RATE: 20 BRPM | WEIGHT: 150 LBS | HEIGHT: 67 IN | BODY MASS INDEX: 23.54 KG/M2 | SYSTOLIC BLOOD PRESSURE: 92 MMHG

## 2019-02-28 VITALS
OXYGEN SATURATION: 99 % | HEART RATE: 65 BPM | HEIGHT: 67 IN | SYSTOLIC BLOOD PRESSURE: 100 MMHG | DIASTOLIC BLOOD PRESSURE: 60 MMHG | WEIGHT: 153 LBS | BODY MASS INDEX: 24.01 KG/M2

## 2019-02-28 VITALS
HEART RATE: 62 BPM | WEIGHT: 165 LBS | HEIGHT: 67 IN | BODY MASS INDEX: 25.9 KG/M2 | DIASTOLIC BLOOD PRESSURE: 62 MMHG | SYSTOLIC BLOOD PRESSURE: 102 MMHG | OXYGEN SATURATION: 99 %

## 2019-02-28 VITALS
HEIGHT: 67 IN | WEIGHT: 162 LBS | OXYGEN SATURATION: 98 % | SYSTOLIC BLOOD PRESSURE: 112 MMHG | DIASTOLIC BLOOD PRESSURE: 60 MMHG | HEART RATE: 74 BPM | BODY MASS INDEX: 25.43 KG/M2

## 2019-02-28 VITALS
WEIGHT: 166 LBS | HEART RATE: 71 BPM | RESPIRATION RATE: 18 BRPM | SYSTOLIC BLOOD PRESSURE: 108 MMHG | HEIGHT: 67 IN | BODY MASS INDEX: 26.06 KG/M2 | DIASTOLIC BLOOD PRESSURE: 68 MMHG | OXYGEN SATURATION: 99 %

## 2019-02-28 VITALS
RESPIRATION RATE: 20 BRPM | SYSTOLIC BLOOD PRESSURE: 102 MMHG | HEIGHT: 67 IN | WEIGHT: 151 LBS | OXYGEN SATURATION: 99 % | BODY MASS INDEX: 23.7 KG/M2 | HEART RATE: 89 BPM | DIASTOLIC BLOOD PRESSURE: 66 MMHG

## 2019-03-01 VITALS
BODY MASS INDEX: 26.53 KG/M2 | RESPIRATION RATE: 18 BRPM | DIASTOLIC BLOOD PRESSURE: 70 MMHG | HEART RATE: 74 BPM | HEIGHT: 67 IN | WEIGHT: 169 LBS | SYSTOLIC BLOOD PRESSURE: 116 MMHG | OXYGEN SATURATION: 98 %

## 2019-03-01 VITALS
SYSTOLIC BLOOD PRESSURE: 104 MMHG | HEART RATE: 64 BPM | RESPIRATION RATE: 16 BRPM | DIASTOLIC BLOOD PRESSURE: 60 MMHG | WEIGHT: 169 LBS

## 2019-09-20 ENCOUNTER — TELEPHONE (OUTPATIENT)
Dept: FAMILY MEDICINE CLINIC | Facility: CLINIC | Age: 74
End: 2019-09-20

## 2021-02-01 DIAGNOSIS — Z23 NEED FOR VACCINATION: ICD-10-CM

## 2024-12-31 NOTE — TELEPHONE ENCOUNTER
Call from Tasha Miranda the APN at Heart Failure clinic says his already tense legs suffered a skin tear. Bubbling red  Start keflex at lwoer dose fo renal issues at 250 tid for 10 days   F/u with [facility] next week  Please assist pt with appt for next week.   Florin Reilly Adult

## 2025-06-10 NOTE — PROGRESS NOTES
Progress Note     Manolo Beltran is a 66 yrs old male with pmh of CKD stage III, DM II x 1 yr, CAD s/p PTCA and CABG x 4, A-fib on coumadin, CHF with EF 15-20%%, Mitral regurgitation s/p valve repair with clip, s/p ICD,  Ex tobacoo abuse x 20 yrs, Gout wh Comment: Quadruple bypass  No date: OTHER SURGICAL HISTORY      Comment: Nasal surgery        Medications (Active prior to today's visit):    Current Outpatient Prescriptions:  atorvastatin 40 MG Oral Tab Take 40 mg by mouth nightly.  Disp:  Rfl: 3   allopu rash  Musculoskeletal:  Negative for bone/joint symptoms  Neurological:  Negative for gait disturbance  Psychiatric:  Negative for inappropriate interaction        03/08/18  1438   BP: 100/66   Pulse: 54   Temp: (!) 97 °F (36.1 °C)       PHYSICAL EXAM: Referrals:  None     3/8/2018  Pilar Martin MD no

## (undated) NOTE — LETTER
7/10/2017              Ronn Calles 36         To whom it may concern,    Tanya Amezquita was seen in the office today. Please excuse him from work.       Sincerely,        MEDARDO Jenkins S

## (undated) NOTE — LETTER
2/7/2018              [address] [facility]         Dear Shashi Crowell records indicate that the blood test ordered for you by Kecia Portillo MD  has not been done.   If you have, in fact, already completed the

## (undated) NOTE — MR AVS SNAPSHOT
After Visit Summary   1/10/2018    Chano Pool    MRN: [id number]           Visit Information     Date & Time  1/10/2018 11:40 AM Provider  Lilia Caal MD [address] [place] [address], [address] [place] [address] Dept.  Phone  [phone] to securely access your online medical record. [employer] allows you to send messages to your doctor, view test results, renew prescriptions and request appointments. How Do I Sign Up? 1. In your Internet browser, go to [email or web address]. c complete it and provide feedback. We strive to deliver the best patient experience and are looking for ways to make improvements. Your feedback will help us do so. For more information on [employer], please visit [email or web address]

## (undated) NOTE — MR AVS SNAPSHOT
[facility]  [address] [place] [address] [facility] [id number] [phone]               Thank you for choosing us for your health care visit with Pilar Martin MD.  We are glad to serve you and happy to provide you with this summary of your visit. Commonly known as:  BETAPACE           torsemide 20 MG Tabs   Take 1 tablet (20 mg total) by mouth daily.    What changed:    - medication strength  - additional instructions   Commonly known as:  DEMADEX           Warfarin Sodium 5 MG Tabs   Take 1 tablet HOW TO GET STARTED: HOW TO STAY MOTIVATED:   Start activities slowly and build up over time Do what you like   Get your heart pumping – brisk walking, biking, swimming Even 10 minute increments are effective and add up over the week   2 ½ hours per week –

## (undated) NOTE — MR AVS SNAPSHOT
[facility]  [address] [place] [address], [address] [place] [address]               Thank you for choosing us for your health care visit with Te Tsang. DO Marcela.   We are glad to serve you and happy to provide you with this summary This list is accurate as of: 1/12/17  3:47 PM.  Always use your most recent med list.                COREG 25 MG Tabs   Generic drug:  carvedilol   Take 25 mg by mouth 2 (two) times daily.            Isosorb Dinitrate-Hydralazine 20-37.5 MG Tabs   Take 2 ta